# Patient Record
Sex: FEMALE | Race: OTHER | Employment: UNEMPLOYED | URBAN - METROPOLITAN AREA
[De-identification: names, ages, dates, MRNs, and addresses within clinical notes are randomized per-mention and may not be internally consistent; named-entity substitution may affect disease eponyms.]

---

## 2020-08-20 ENCOUNTER — TELEPHONE (OUTPATIENT)
Dept: FAMILY MEDICINE CLINIC | Age: 40
End: 2020-08-20

## 2020-08-20 NOTE — TELEPHONE ENCOUNTER
----- Message from Markel Nageotte sent at 8/18/2020  4:15 PM EDT -----  Regarding: Dr. Ysabel Hanna  Appointment not available    Caller's first and last name and relationship to patient (if not the patient):      Best contact number: [phone]      Preferred date and time:First  available       Scheduled appointment date and time: N/A       Reason for appointment: Pt is in need of a , she is currently pregnant and she has no insurance attempting to schedule a NP appointment       Details to clarify the request:      [place]

## 2020-08-21 NOTE — TELEPHONE ENCOUNTER
Scheduled with patient through [place]. Dr. Tati Tamez   Received: 2 days ago   Message Contents   Adrianna Benavides [facility] Front Office                 Reason: The patient would like to schedule an in-person visit in regard to a new patient appointment with Dr. Hever Cross.      Phone: [phone]

## 2020-08-22 ENCOUNTER — TELEPHONE (OUTPATIENT)
Dept: FAMILY MEDICINE CLINIC | Age: 40
End: 2020-08-22

## 2020-08-22 NOTE — TELEPHONE ENCOUNTER
----- Message from Rosie Talley sent at 2020 10:57 AM EDT -----  Regarding: Dr. Liss Thorne telephone  Appointment not available    Patient's first and last name: Jeanette Chong  : 1980    Caller's first and last name and relationship to patient (if not the patient): pt  Best contact number: [phone]  Preferred date and time: Any day Evening  Scheduled appointment date and time: n/a  Reason for appointment: New Patient  Details to clarify the request: Pt would like to schedule a appointment as soon as possible for prenatal care. Pt would like a call back to schedule in office. Pt would like to establish care with PCP. Pt need .

## 2020-08-25 ENCOUNTER — VIRTUAL VISIT (OUTPATIENT)
Dept: FAMILY MEDICINE CLINIC | Age: 40
End: 2020-08-25

## 2020-08-25 DIAGNOSIS — N92.6 MISSED MENSES: ICD-10-CM

## 2020-08-25 DIAGNOSIS — Z3A.19 19 WEEKS GESTATION OF PREGNANCY: Primary | ICD-10-CM

## 2020-08-25 DIAGNOSIS — O09.529 ANTEPARTUM MULTIGRAVIDA OF ADVANCED MATERNAL AGE: ICD-10-CM

## 2020-08-25 DIAGNOSIS — Z98.891 HISTORY OF CESAREAN DELIVERY: ICD-10-CM

## 2020-08-25 PROCEDURE — 99443 PR PHYS/QHP TELEPHONE EVALUATION 21-30 MIN: CPT | Performed by: STUDENT IN AN ORGANIZED HEALTH CARE EDUCATION/TRAINING PROGRAM

## 2020-08-25 NOTE — PROGRESS NOTES
[address] Medicine Residency Attending Addendum:  Dr. Lisset Mayers MD,  the patient and I were not physically present during this encounter. The resident and I are concurrently monitoring the patient care through appropriate telecommunication technology. I discussed the findings, assessment and plan with the resident and agree with the resident's findings and plan as documented in the resident's note. Lorie oGlden MD      Will get in person initial OB as soon as possible. Will need dating US, MFM consult and initial labs. Will also need to start on aspirin for AMA.

## 2020-08-25 NOTE — PROGRESS NOTES
Marzena Smith  47 y.o. female  1980  [address] [facility]  [id number]    [phone] (home)      [address]:    Telephone Encounter  Cam Wallace MD       Encounter Date: 2020 at 4:07 PM    Consent: Marzena Smith, who was seen by synchronous (real-time) audio only technology, and/or her healthcare decision maker, is aware that this patient-initiated, Telehealth encounter on 2020 is a billable service, with coverage as determined by her insurance carrier. She is aware that she may receive a bill and has provided verbal consent to proceed: Yes. Chief Complaint   Patient presents with    Missed Menses       History of Present Illness   Marzena Smith is a 47 y.o. female was evaluated by telephone. I communicated with the patient and/or health care decision maker about missed menses. This is not a planned pregnancy, is happy about pregnancy. Does not have a relationship with FOB. She is at 19w2d gestation by LMP (LMP 20). Has regular periods    History of GDM or DM? no  History of GHTN or HTN? no  History of pre-eclampsia? no  Taking prenatal vitamins? no  History of Sexual trauma? ?? No    OB History    Para Term  AB Living   3 3 3 0 0 2   SAB TAB Ectopic Molar Multiple Live Births             2      # Outcome Date GA Lbr Elijah/2nd Weight Sex Delivery Anes PTL Lv   3 Term 10/28/04 40w0d  7 lb (3.175 kg) F CS-Unspec  N MILAGROS      Complications: Previous  section   2 Term 01 40w0d   F CS-Unspec  Y MILAGROS      Complications:  Other (comment), Transverse or oblique fetal presentation   1 Term                  Patient Screening for COVID-19:     1) Patient denies complaints of shortness of breath or difficulty breathing, sore throat,  chills, fatigue, muscle aches, loss of taste or smell, or GI symptoms(nausea, vomiting or diarrhea): No    2) Patient denies complaints of cough or fever over 100F: No    3) Patient denies leaving the country in the past 14 days or any other recent travel: No    4) Patient denies being exposed to anyone with COVID-19 and has not been around any one that has been sick with COVID-19 like symptoms as listed above: No     5) Patient informed to wear mask when arriving for appointment: No        Review of Systems   Review of Systems   Constitutional: Negative for chills and fever. HENT: Negative for congestion and sore throat. Eyes: Negative for blurred vision and double vision. Respiratory: Negative for cough, hemoptysis, sputum production, shortness of breath and wheezing. Cardiovascular: Negative for chest pain, palpitations and leg swelling. Gastrointestinal: Negative for abdominal pain, blood in stool, constipation, diarrhea, heartburn, melena, nausea and vomiting. Genitourinary: Negative for dysuria and urgency. Musculoskeletal: Negative for back pain and joint pain. Skin: Negative for rash. Neurological: Negative for dizziness, focal weakness and headaches. Psychiatric/Behavioral: Negative for suicidal ideas. Vitals/Objective:   General: Patient speaking in complete sentences without effort. Normal speech and cooperative. Due to this being a Virtual Check-in/Telephone evaluation, many elements of the physical examination are unable to be assessed. Assessment and Plan:   Time-based coding, delete if not needed: I spent at least 25 minutes with this established patient, and >50% of the time was spent counseling and/or coordinating care regarding missed menses  Total Time: minutes: 30  This is a 47 y.o  at 19w2d by LMP. Preganancy is complicated by history of previous c-sections x2, AMA, and late to care. 1. SIUP at 19w2d by LMP  - Patient was advised to start taking prenatal vitamins  - prenatal vit-iron fumarate-fa 27 mg iron- 0.8 mg tab tablet; Take 1 Tab by mouth daily. Dispense: 120 Tab;  Refill: 3  - Will schedule initial ob appointment    2. History of previous C-sections x2.   - Patient will need repeat             We discussed the expected course, resolution and complications of the diagnosis(es) in detail. Medication risks, benefits, costs, interactions, and alternatives were discussed as indicated. I advised her to contact the office if her condition worsens, changes or fails to improve as anticipated. She expressed understanding with the diagnosis(es) and plan. Patient understands that this encounter was a temporary measure, and the importance of further follow up and examination was emphasized. Patient verbalized understanding. Patient informed to follow up: Within the next two weeks. I affirm this is a Patient Initiated Episode with an Established Patient who has not had a related appointment within my department in the past 7 days or scheduled within the next 24 hours. Note: not billable if this call serves to triage the patient into an appointment for the relevant concern      Electronically Signed: Romeo Elliott MD  Providers location when delivering service: home    CPT:  92346 (5-10 minutes)  92372 (11-20 minutes)  21  (21-30 minutes)    Medicare:   - Virtual Check-in      ICD-10-CM ICD-9-CM    1. 19 weeks gestation of pregnancy  Z3A.19 V22.2 prenatal vit-iron fumarate-fa 27 mg iron- 0.8 mg tab tablet   2. Missed menses  N92.6 626.4    3. Antepartum multigravida of advanced maternal age  O12.46 65.56    3. History of  delivery  I31.008 V45.89        Pursuant to the emergency declaration under the [facility] waiver authority and the [employer] Act, this Virtual  Visit was conducted, with patient's consent, to reduce the patient's risk of exposure to COVID-19 and provide continuity of care for an established patient.       History   Patients past medical, surgical and family histories were personally reviewed and updated. No past medical history on file. Past Surgical History:   Procedure Laterality Date    HX  SECTION       No family history on file.   Social History     Socioeconomic History    Marital status: Not on file     Spouse name: Not on file    Number of children: Not on file    Years of education: Not on file    Highest education level: Not on file   Occupational History    Not on file   Social Needs    Financial resource strain: Not on file    Food insecurity     Worry: Not on file     Inability: Not on file    Transportation needs     Medical: Not on file     Non-medical: Not on file   Tobacco Use    Smoking status: Never Smoker   Substance and Sexual Activity    Alcohol use: Not Currently    Drug use: Never    Sexual activity: Not Currently     Partners: Male   Lifestyle    Physical activity     Days per week: Not on file     Minutes per session: Not on file    Stress: Not on file   Relationships    Social connections     Talks on phone: Not on file     Gets together: Not on file     Attends Mosque service: Not on file     Active member of club or organization: Not on file     Attends meetings of clubs or organizations: Not on file     Relationship status: Not on file    Intimate partner violence     Fear of current or ex partner: Not on file     Emotionally abused: Not on file     Physically abused: Not on file     Forced sexual activity: Not on file   Other Topics Concern    Not on file   Social History Narrative    Not on file            Current Medications/Allergies   Medications and Allergies reviewed:      Not on File

## 2020-08-25 NOTE — Clinical Note
Douglas Monday!!! This patient is late to care and is already 19weeks. I was hoping we could get her in ASAP for initial ob.      Thank you

## 2020-08-27 ENCOUNTER — TELEPHONE (OUTPATIENT)
Dept: FAMILY MEDICINE CLINIC | Age: 40
End: 2020-08-27

## 2020-09-04 ENCOUNTER — HOSPITAL ENCOUNTER (OUTPATIENT)
Dept: LAB | Age: 40
Discharge: HOME | End: 2020-09-04
Payer: SUBSIDIZED

## 2020-09-04 ENCOUNTER — INITIAL PRENATAL (OUTPATIENT)
Dept: FAMILY MEDICINE CLINIC | Age: 40
End: 2020-09-04
Payer: SUBSIDIZED

## 2020-09-04 VITALS
OXYGEN SATURATION: 97 % | WEIGHT: 146 LBS | RESPIRATION RATE: 14 BRPM | BODY MASS INDEX: 32.84 KG/M2 | HEIGHT: 56 IN | DIASTOLIC BLOOD PRESSURE: 57 MMHG | HEART RATE: 90 BPM | TEMPERATURE: 98.9 F | SYSTOLIC BLOOD PRESSURE: 95 MMHG

## 2020-09-04 DIAGNOSIS — O99.210 OBESITY AFFECTING PREGNANCY, ANTEPARTUM: ICD-10-CM

## 2020-09-04 DIAGNOSIS — O09.529 ANTEPARTUM MULTIGRAVIDA OF ADVANCED MATERNAL AGE: ICD-10-CM

## 2020-09-04 DIAGNOSIS — Z98.891 HISTORY OF 2 CESAREAN SECTIONS: ICD-10-CM

## 2020-09-04 DIAGNOSIS — O09.30 LATE PRENATAL CARE: ICD-10-CM

## 2020-09-04 DIAGNOSIS — O09.92 SUPERVISION OF HIGH RISK PREGNANCY IN SECOND TRIMESTER: ICD-10-CM

## 2020-09-04 DIAGNOSIS — O09.92 SUPERVISION OF HIGH RISK PREGNANCY IN SECOND TRIMESTER: Primary | ICD-10-CM

## 2020-09-04 LAB
ANTIBODY SCREEN, EXTERNAL: NEGATIVE
BILIRUB UR QL STRIP: NEGATIVE
CHLAMYDIA, EXTERNAL: NEGATIVE
ERYTHROCYTE [DISTWIDTH] IN BLOOD BY AUTOMATED COUNT: 13.3 % (ref 11.5–14.5)
EST. AVERAGE GLUCOSE BLD GHB EST-MCNC: 103 MG/DL
GLUCOSE UR-MCNC: NEGATIVE MG/DL
HBA1C MFR BLD: 5.2 % (ref 4–5.6)
HBSAG, EXTERNAL: NEGATIVE
HCT VFR BLD AUTO: 36.3 % (ref 35–47)
HGB BLD-MCNC: 11.8 G/DL (ref 11.5–16)
HIV, EXTERNAL: NON REACTIVE
KETONES P FAST UR STRIP-MCNC: NORMAL MG/DL
MCH RBC QN AUTO: 31 PG (ref 26–34)
MCHC RBC AUTO-ENTMCNC: 32.5 G/DL (ref 30–36.5)
MCV RBC AUTO: 95.3 FL (ref 80–99)
N. GONORRHEA, EXTERNAL: NEGATIVE
NRBC # BLD: 0 K/UL (ref 0–0.01)
NRBC BLD-RTO: 0 PER 100 WBC
PH UR STRIP: 6 [PH] (ref 4.6–8)
PLATELET # BLD AUTO: 267 K/UL (ref 150–400)
PMV BLD AUTO: 9.4 FL (ref 8.9–12.9)
PROT UR QL STRIP: NEGATIVE
RBC # BLD AUTO: 3.81 M/UL (ref 3.8–5.2)
RPR, EXTERNAL: NON REACTIVE
RUBELLA, EXTERNAL: NORMAL
SP GR UR STRIP: 1.03 (ref 1–1.03)
TYPE, ABO & RH, EXTERNAL: NORMAL
UA UROBILINOGEN AMB POC: NORMAL (ref 0.2–1)
URINALYSIS CLARITY POC: NORMAL
URINALYSIS COLOR POC: YELLOW
URINE BLOOD POC: NEGATIVE
URINE LEUKOCYTES POC: NEGATIVE
URINE NITRITES POC: NEGATIVE
WBC # BLD AUTO: 9.3 K/UL (ref 3.6–11)

## 2020-09-04 PROCEDURE — 90686 IIV4 VACC NO PRSV 0.5 ML IM: CPT | Performed by: FAMILY MEDICINE

## 2020-09-04 PROCEDURE — 87491 CHLMYD TRACH DNA AMP PROBE: CPT

## 2020-09-04 PROCEDURE — 88175 CYTOPATH C/V AUTO FLUID REDO: CPT

## 2020-09-04 PROCEDURE — 81003 URINALYSIS AUTO W/O SCOPE: CPT | Performed by: FAMILY MEDICINE

## 2020-09-04 PROCEDURE — 90471 IMMUNIZATION ADMIN: CPT | Performed by: FAMILY MEDICINE

## 2020-09-04 PROCEDURE — 87624 HPV HI-RISK TYP POOLED RSLT: CPT

## 2020-09-04 PROCEDURE — 0500F INITIAL PRENATAL CARE VISIT: CPT | Performed by: FAMILY MEDICINE

## 2020-09-04 RX ORDER — TRIAMCINOLONE ACETONIDE 1 MG/G
CREAM TOPICAL 2 TIMES DAILY
Qty: 15 G | Refills: 0 | Status: SHIPPED | OUTPATIENT
Start: 2020-09-04

## 2020-09-04 NOTE — PROGRESS NOTES
History and Physical    Patient: Yoshi Franklin MRN: [id number]  SSN: [id number]    YOB: 1980  Age: 47 y.o. Sex: female      Subjective:      Yoshi Franklin is a 47 y.o. female  at 20w5d who presents for IOB visit. Surprise pregnancy, is happy about it  FOB not involved, she does know who it is, they do not talk and he will not be involved   Pt babysits for work  She lives with her 2 daughters and a friend   Has lived in this country for 15 years   Cannot read    Rash on L arm   Present for 1 month  Itches  No one around her with the same rash     No past medical history on file. Past Surgical History:   Procedure Laterality Date    HX  SECTION        History reviewed. No pertinent family history. Social History     Tobacco Use    Smoking status: Never Smoker    Smokeless tobacco: Never Used   Substance Use Topics    Alcohol use: Not Currently      Prior to Admission medications    Medication Sig Start Date End Date Taking? Authorizing Provider   triamcinolone acetonide (KENALOG) 0.1 % topical cream Apply  to affected area two (2) times a day. use thin layer 20  Yes Harmeet Arceo, DO        No Known Allergies    Review of Systems:  ROS negative except as noted in HPI. Objective:     Vitals:    20 1402   BP: 95/57   Pulse: 90   Resp: 14   Temp: 98.9 °F (37.2 °C)   TempSrc: Oral   SpO2: 97%   Weight: 146 lb (66.2 kg)   Height: 4' 7.91\" (1.42 m)        Physical Exam:  See prenatal physical exam.    Assessment/Plan:   41yo  at 20w5d by LMP   1. IUP: IOB labs with pap today, anatomy scheduled  2. AMA: ASA, will see genetic counselor   3. Obesity: check A1C, will get GTT next visit   4.   Late to care: 20 wk     Signed By: Saba Gonzalez DO     2020

## 2020-09-04 NOTE — PROGRESS NOTES
History and Physical    Patient: Ashlee Pena MRN: [id number]  SSN: [id number]    YOB: 1980  Age: 47 y.o. Sex: female      Subjective:      Ashlee Pena is a 47 y.o. female  at 20w5d who presents for IOB visit. Surprise pregnancy, is happy about it  FOB not involved, she does know who it is, they do not talk and he will not be involved   Pt babysits for work  She lives with her 2 daughters and a friend   Has lived in this country for 15 years   Cannot read    Rash on L arm   Present for 1 month  Itches  No one around her with the same rash     No past medical history on file. Past Surgical History:   Procedure Laterality Date    HX  SECTION        History reviewed. No pertinent family history. Social History     Tobacco Use    Smoking status: Never Smoker    Smokeless tobacco: Never Used   Substance Use Topics    Alcohol use: Not Currently      Prior to Admission medications    Medication Sig Start Date End Date Taking? Authorizing Provider   triamcinolone acetonide (KENALOG) 0.1 % topical cream Apply  to affected area two (2) times a day. use thin layer 20  Yes Harmeet Arceo, DO        No Known Allergies    Review of Systems:  ROS negative except as noted in HPI. Objective:     Vitals:    20 1402   BP: 95/57   Pulse: 90   Resp: 14   Temp: 98.9 °F (37.2 °C)   TempSrc: Oral   SpO2: 97%   Weight: 146 lb (66.2 kg)   Height: 4' 7.91\" (1.42 m)        Physical Exam:  See prenatal physical exam.    Assessment/Plan:   41yo  at 20w5d by LMP   1. IUP: IOB labs with pap today, anatomy scheduled  2. AMA: ASA, declines genetic testing, will see genetic counselor   3. Obesity: check A1C, will get GTT next visit   4. Late to care: 20 wk   5.   Hx CS: x2, will plan for repeat at 39wk     Signed By: Jarrett Sabillon DO     2020

## 2020-09-04 NOTE — PROGRESS NOTES
Chief Complaint   Patient presents with    Initial Prenatal Visit       Patient identified with 2 patient identifiers (name and D. O. B)    Patient is a  at [employer]    Leakage of Fluid: NO  Vaginal Bleeding: NO  Fetal Movement: YES  Prenatal vitamins: YES  Having Contractions: NO  Pain: NO  Patient has rash on left arm x 1 month. Itches.     Visit Vitals  BP 95/57 (BP 1 Location: Left arm, BP Patient Position: Sitting)   Pulse 90   Temp 98.9 °F (37.2 °C) (Oral)   Resp 14   Ht 4' 7.91\" (1.42 m)   Wt 146 lb (66.2 kg)   LMP 2020   SpO2 97%   BMI 32.84 kg/m²       Immunization History   Administered Date(s) Administered    Influenza Vaccine (Quad) PF 2020

## 2020-09-05 LAB
ABO + RH BLD: NORMAL
BLOOD BANK CMNT PATIENT-IMP: NORMAL
BLOOD GROUP ANTIBODIES SERPL: NORMAL
HBV SURFACE AG SER QL: <0.1 INDEX
HBV SURFACE AG SER QL: NEGATIVE
HIV 1+2 AB+HIV1 P24 AG SERPL QL IA: NONREACTIVE
HIV12 RESULT COMMENT, HHIVC: NORMAL
RUBV IGG SER-IMP: REACTIVE
RUBV IGG SERPL IA-ACNC: 41.4 IU/ML
SPECIMEN EXP DATE BLD: NORMAL

## 2020-09-06 LAB
BACTERIA SPEC CULT: NORMAL
CC UR VC: NORMAL
RPR SER QL: NONREACTIVE
SERVICE CMNT-IMP: NORMAL

## 2020-09-07 LAB — VZV IGG SER IA-ACNC: 483 INDEX

## 2020-09-09 LAB
DEPRECATED HGB OTHER BLD-IMP: 0 %
HGB A MFR BLD: 97.6 % (ref 96.4–98.8)
HGB A2 MFR BLD COLUMN CHROM: 2.4 % (ref 1.8–3.2)
HGB C MFR BLD: 0 %
HGB F MFR BLD: 0 % (ref 0–2)
HGB FRACT BLD-IMP: NORMAL
HGB S BLD QL SOLY: NEGATIVE
HGB S MFR BLD: 0 %

## 2020-09-13 LAB
C TRACH RRNA SPEC QL NAA+PROBE: NEGATIVE
N GONORRHOEA RRNA SPEC QL NAA+PROBE: NEGATIVE
SPECIMEN SOURCE: NORMAL

## 2020-09-30 ENCOUNTER — HOSPITAL ENCOUNTER (OUTPATIENT)
Dept: PERINATAL CARE | Age: 40
Discharge: HOME | End: 2020-09-30
Attending: OBSTETRICS & GYNECOLOGY
Payer: SUBSIDIZED

## 2020-09-30 PROCEDURE — 99204 OFFICE O/P NEW MOD 45 MIN: CPT | Performed by: OBSTETRICS & GYNECOLOGY

## 2020-09-30 PROCEDURE — 76811 OB US DETAILED SNGL FETUS: CPT | Performed by: OBSTETRICS & GYNECOLOGY

## 2020-10-20 ENCOUNTER — TELEPHONE (OUTPATIENT)
Dept: FAMILY MEDICINE CLINIC | Age: 40
End: 2020-10-20

## 2020-10-20 NOTE — PROGRESS NOTES
Estimated Date of Delivery: 21  .   EDC by 26 week scan therefore poor dating  Normal anatomy but limited  F/u as CI

## 2020-10-20 NOTE — TELEPHONE ENCOUNTER
----- Message from Armando Chen sent at 10/20/2020  8:28 AM EDT -----  Regarding: Dr. Lucy Arceo /telephone  Patient return call    Caller's first and last name and relationship (if not the patient):   Sylvia Yap      Best contact number(s):  [phone]      Whose call is being returned:  Dr. Lucy Arceo       Details to clarify the request:   patient is returning call from message left on 10/19/2020      Cyndi Henry

## 2020-10-22 NOTE — TELEPHONE ENCOUNTER
Can you call patient and let her know she needs to come in for an appointment.  Scheduled for 10/28 at 8:45AM.

## 2020-10-23 ENCOUNTER — TELEPHONE (OUTPATIENT)
Dept: FAMILY MEDICINE CLINIC | Age: 40
End: 2020-10-23

## 2020-10-23 NOTE — TELEPHONE ENCOUNTER
Called patient to let her know her appointment is on 10/28 @ 8:45am.   LVm to patient as she did not respond

## 2020-10-28 ENCOUNTER — ROUTINE PRENATAL (OUTPATIENT)
Dept: FAMILY MEDICINE CLINIC | Age: 40
End: 2020-10-28
Payer: SUBSIDIZED

## 2020-10-28 VITALS
WEIGHT: 151 LBS | BODY MASS INDEX: 33.97 KG/M2 | SYSTOLIC BLOOD PRESSURE: 96 MMHG | TEMPERATURE: 98.2 F | OXYGEN SATURATION: 96 % | HEIGHT: 56 IN | DIASTOLIC BLOOD PRESSURE: 62 MMHG | HEART RATE: 84 BPM

## 2020-10-28 DIAGNOSIS — Z98.891 HISTORY OF C-SECTION: ICD-10-CM

## 2020-10-28 DIAGNOSIS — Z3A.30 30 WEEKS GESTATION OF PREGNANCY: Primary | ICD-10-CM

## 2020-10-28 DIAGNOSIS — O99.210 OBESITY IN PREGNANCY: ICD-10-CM

## 2020-10-28 DIAGNOSIS — O09.529 HIGH-RISK PREGNANCY, MULTIGRAVIDA OF ADVANCED MATERNAL AGE, ANTEPARTUM: ICD-10-CM

## 2020-10-28 LAB
BILIRUB UR QL STRIP: NEGATIVE
ERYTHROCYTE [DISTWIDTH] IN BLOOD BY AUTOMATED COUNT: 13.1 % (ref 11.5–14.5)
GLUCOSE 1H P 100 G GLC PO SERPL-MCNC: 155 MG/DL (ref 65–140)
GLUCOSE UR-MCNC: NEGATIVE MG/DL
HCT VFR BLD AUTO: 38.3 % (ref 35–47)
HGB BLD-MCNC: 12.6 G/DL (ref 11.5–16)
KETONES P FAST UR STRIP-MCNC: NEGATIVE MG/DL
MCH RBC QN AUTO: 31.1 PG (ref 26–34)
MCHC RBC AUTO-ENTMCNC: 32.9 G/DL (ref 30–36.5)
MCV RBC AUTO: 94.6 FL (ref 80–99)
NRBC # BLD: 0 K/UL (ref 0–0.01)
NRBC BLD-RTO: 0 PER 100 WBC
PH UR STRIP: 6.5 [PH] (ref 4.6–8)
PLATELET # BLD AUTO: 260 K/UL (ref 150–400)
PMV BLD AUTO: 9.8 FL (ref 8.9–12.9)
PROT UR QL STRIP: NEGATIVE
RBC # BLD AUTO: 4.05 M/UL (ref 3.8–5.2)
SP GR UR STRIP: 1.02 (ref 1–1.03)
UA UROBILINOGEN AMB POC: NORMAL (ref 0.2–1)
URINALYSIS CLARITY POC: NORMAL
URINALYSIS COLOR POC: YELLOW
URINE BLOOD POC: NEGATIVE
URINE LEUKOCYTES POC: NEGATIVE
URINE NITRITES POC: NEGATIVE
WBC # BLD AUTO: 9.4 K/UL (ref 3.6–11)

## 2020-10-28 PROCEDURE — 90715 TDAP VACCINE 7 YRS/> IM: CPT | Performed by: STUDENT IN AN ORGANIZED HEALTH CARE EDUCATION/TRAINING PROGRAM

## 2020-10-28 PROCEDURE — 0502F SUBSEQUENT PRENATAL CARE: CPT | Performed by: STUDENT IN AN ORGANIZED HEALTH CARE EDUCATION/TRAINING PROGRAM

## 2020-10-28 PROCEDURE — 81003 URINALYSIS AUTO W/O SCOPE: CPT | Performed by: STUDENT IN AN ORGANIZED HEALTH CARE EDUCATION/TRAINING PROGRAM

## 2020-10-28 PROCEDURE — 90471 IMMUNIZATION ADMIN: CPT | Performed by: STUDENT IN AN ORGANIZED HEALTH CARE EDUCATION/TRAINING PROGRAM

## 2020-10-28 NOTE — PATIENT INSTRUCTIONS
Semanas 26 a 30 de boyd embarazo: Instrucciones de cuidado  Weeks 26 to 30 of Your Pregnancy: Care Instructions  Instrucciones de cuidado    Ahora usted se encuentra en el último trimestre del [employer]. Boyd bebé está creciendo rápidamente. Y es probable que sienta que boyd bebé se mueve con más frecuencia. Es posible que boyd médico le diga que cuente la cantidad de patadas que da boyd bebé. La espalda puede dolerle mientras boyd cuerpo se acostumbra al tamaño y a la longitud de boyd bebé. Si todavía no le corona aplicado la vacuna Tdap (tétanos, difteria y tos [place]) kuldeep pablo [employer]ru[employer], hable con boyd médico acerca de aplicársela. Rosetta Arjun a proteger a boyd recién nacido contra la infección por tos ferina. Kuldeep pablo tiempo, es importante que se cuide y preste atención a lo que boyd cuerpo necesita. Si tiene [employer], busque formas de estar con boyd luis que satisfagan boyd nivel de comodidad y deseo. Utilice las recomendaciones proporcionadas en esta hoja de cuidados para encontrar boyd propia manera de vivir la sexualidad. La atención de seguimiento es iman parte clave de boyd tratamiento y seguridad. Asegúrese de hacer y acudir a todas las citas, y llame a boyd médico si está teniendo problemas. También es iman buena idea saber los resultados de annamarie exámenes y mantener iman lista de los medicamentos que candido. ¿Cómo puede cuidarse en el hogar? [place] boyd trabajo con calma  · Tómese descansos frecuentes. Si es posible, deje de trabajar cuando esté cansada y descanse kuldeep la hora del almuerzo. · Vaya al baño cada 2 horas. · Cambie de posición con frecuencia. Si está sentada kuldeep mucho tiempo, póngase de pie y camine. · Si está lemons kuldeep mucho tiempo, apoye un pie sobre un banco bajo, flexionando la rodilla. Después de estar lemons kuldeep mucho tiempo, siéntese con los pies elevados.   · Evite las [place], las sustancias químicas y el humo del tabaco.  Viva boyd sexualidad a boyd manera  · Apteegi 1 kuldeep el [employer], a menos que boyd médico le diga que no. · Podría tener un gran deseo sexual o estar completamente desinteresada. · El abdomen cada vez más radha podría hacer difícil encontrar iman buena posición kuldeep el coito. Experimente y explore. · Cuando boyd luis le toque los senos, podría tener contracciones en [place]. · Un masaje en la espalda podría aliviar el dolor de espalda o los cólicos que a veces se sienten después del [place]. Aprenda sobre el trabajo de parto prematuro  · Esté alerta a las señales del trabajo de parto prematuro. Es posible que esté comenzando el [facility] [place] de parto si:  ? Tiene cólicos similares a los del período menstrual, con o sin náuseas. ? Tiene aproximadamente 4 contracciones o más en 20 minutos, o alrededor de 8 o más en 1 hora, incluso después de driss tomado un vaso de agua y estar en reposo. ? Tiene un dolor sordo (leve amber continuo) en la endy baja de la espalda que no desaparece cuando cambia de posición. ? Siente dolor o presión en la pelvis que aparece y desaparece con determinada regularidad. ? Tiene espasmos intestinales o síntomas similares a los de la gripe, con o sin diarrea. ? Nota un aumento o un cambio en el flujo vaginal. El flujo podría ser Berdine Her, tener consistencia mucosa, ser [place]ocker rastros de [place]. ? Se le rompe la traci. · Si augie que ha comenzado un trabajo de parto prematuro:  ? Helen 2 o 3 vasos de agua o jugo. No beber suficientes líquidos puede provocar contracciones. ? Detenga lo que esté haciendo, y vacíe la vejiga. Luego, acuéstese sobre el lado alexander kuldeep al menos 1 hora. ? Mientras descansa de corine, ubique boyd esternón. Coloque los dedos en el punto blando bravo debajo de él. Mueva los dedos hacia abajo en dirección al ombligo para encontrar la parte superior del [place]. Compruebe si está tenso. ? Las contracciones pueden ser débiles o intensas. Registre annamarie contracciones kuldeep iman hora.  Cuente iman contracción desde el comienzo de iman hasta el comienzo de [place]. ? Earla Jenniferkun contracciones lisette que no ocurren con la regularidad de un patrón reciben el nombre de contracciones de [place]. Estas son \"contracciones de práctica\", amber no indican el inicio del [place] de [place]. Por lo general, se detienen si cambia de [place]. ? Si tiene contracciones regulares, llame a boyd médico.  ¿Dónde puede encontrar más información en inglés? Thais denny http://www.[employer].com/  Zora Mcwilliams A009 en la búsqueda para aprender más acerca de \"Semanas 26 a 30 de boyd embarazo: Instrucciones de cuidado. \"  Revisado: 11 febrero, 2020               Versión del contenido: 12.6  © 1197-1175 [employer]. Las instrucciones de cuidado fueron adaptadas bajo licencia por [employer] (which disclaims liability or warranty for this information). Si usted tiene [place] afección médica o sobre estas instrucciones, siempre pregunte a boyd profesional de naveen. [employer] niega toda garantía o responsabilidad por boyd uso de esta información.

## 2020-10-28 NOTE — PROGRESS NOTES
Return OB Visit       Subjective:   Rodney Apgar 47 y.o.  at [facility] LING MARCUS:  30w1d, DARIO: Estimated Date of Delivery: 21 by [address] at [address] on . Patient reports feeling well. No new concerns at this time. Patient denies headache, visual disturbances, CP, SOB, RUQ pain, dysuria, and calf tenderness. Pregnancy complicated by AMA, Obesity, h/o CSection x 2, and Late to Care (20w). OB History:  See Chart    LOF: No  Vaginal bleeding: No  Fetal movement (after 20 weeks): Yes  Contractions: No  Taking prenatal vitamins: Yes  Taking ASA: Yes    Hx of GDM or DM: No  Hx of GHTN or HTN: No  Hx of STI's: No  Hx of Depression: No      Allergies- reviewed:   No Known Allergies  Medications- reviewed:   Current Outpatient Medications   Medication Sig    prenatal vit-calcium-iron-fa (PRENATAL PLUS with CALCIUM) 27 mg iron- 1 mg tab Take 1 Tab by mouth daily.  prenatal vit-iron fumarate-fa 27 mg iron- 0.8 mg tab tablet TAKE 1 TABLET BY MOUTH EVERY DAY    triamcinolone acetonide (KENALOG) 0.1 % topical cream Apply  to affected area two (2) times a day. use thin layer     No current facility-administered medications for this visit. Past Medical History- reviewed:  No past medical history on file.   Past Surgical History- reviewed:   Past Surgical History:   Procedure Laterality Date    HX  SECTION       Social History- reviewed:  Social History     Socioeconomic History    Marital status: SINGLE     Spouse name: Not on file    Number of children: Not on file    Years of education: Not on file    Highest education level: Not on file   Occupational History    Not on file   Social Needs    Financial resource strain: Not on file    Food insecurity     Worry: Not on file     Inability: Not on file    Transportation needs     Medical: Not on file     Non-medical: Not on file   Tobacco Use    Smoking status: Never Smoker    Smokeless tobacco: Never Used   Substance and Sexual Activity    Alcohol use: Not Currently    Drug use: Not on file    Sexual activity: Not on file   Lifestyle    Physical activity     Days per week: Not on file     Minutes per session: Not on file    Stress: Not on file   Relationships    Social connections     Talks on phone: Not on file     Gets together: Not on file     Attends Scientologist service: Not on file     Active member of club or organization: Not on file     Attends meetings of clubs or organizations: Not on file     Relationship status: Not on file    Intimate partner violence     Fear of current or ex partner: Not on file     Emotionally abused: Not on file     Physically abused: Not on file     Forced sexual activity: Not on file   Other Topics Concern    Not on file   Social History Narrative    Not on file     Immunizations- reviewed:   Immunization History   Administered Date(s) Administered    Influenza Vaccine True Office) PF (>6 Mo Flulaval, Fluarix, and >3 Yrs Nhung James [id number]) 2020    Tdap 10/28/2020       OB History- reviewed:  OB History    Para Term  AB Living   3 2 2     2   SAB TAB Ectopic Molar Multiple Live Births             2      # Outcome Date GA Lbr Elijah/2nd Weight Sex Delivery Anes PTL Lv   3 Current            2 Term 10/28/04 41w0d  8 lb (3.629 kg) F CS-Unspec  N MILAGROS   1 Term 01 41w0d  10 lb (4.536 kg) F CS-Unspec  N MILAGROS      Birth Comments: CS done for transverse presentation         Objective:     Visit Vitals  BP 96/62 (BP 1 Location: Left arm, BP Patient Position: Sitting)   Pulse 84   Temp 98.2 °F (36.8 °C) (Temporal)   Ht 4' 7.91\" (1.42 m)   Wt 151 lb (68.5 kg)   LMP 2020 (Exact Date)   SpO2 96%   BMI 33.96 kg/m²       Physical Exam:  GENERAL APPEARANCE: alert, well appearing, in no apparent distress  ABDOMEN: gravid, fundal height 29 cm, FHT present at 135 average bpm  PSYCH: normal mood and affect    Labs  Recent Results (from the past 12 hour(s))   AMB POC URINALYSIS DIP STICK AUTO W/O MICRO Collection Time: 10/28/20  8:44 AM   Result Value Ref Range    Color (UA POC) Yellow     Clarity (UA POC) Slightly Cloudy     Glucose (UA POC) Negative Negative    Bilirubin (UA POC) Negative Negative    Ketones (UA POC) Negative Negative    Specific gravity (UA POC) 1.020 1.001 - 1.035    Blood (UA POC) Negative Negative    pH (UA POC) 6.5 4.6 - 8.0    Protein (UA POC) Negative Negative    Urobilinogen (UA POC) 0.2 mg/dL 0.2 - 1    Nitrites (UA POC) Negative Negative    Leukocyte esterase (UA POC) Negative Negative       Assessment         ICD-10-CM ICD-9-CM    1. 30 weeks gestation of pregnancy  Z3A.30 V22.2 AMB POC URINALYSIS DIP STICK AUTO W/O MICRO      GLUCOSE, GESTATIONAL 1 HR TOLERANCE      TETANUS, DIPHTHERIA TOXOIDS AND ACELLULAR PERTUSSIS VACCINE (TDAP), IN INDIVIDS. >=7, IM      CBC W/O DIFF      CBC W/O DIFF      GLUCOSE, GESTATIONAL 1 HR TOLERANCE   2. Obesity in pregnancy  O99.210 649.10    3. High-risk pregnancy, multigravida of advanced maternal age, antepartum  O12.46 V23.82    4. History of   Z98.891 V45.89          Plan   47 y.o.  at 30w1d, DARIO Estimated Date of Delivery: 21 here for return OB visit. SIUP (30w1d)   IOB labs:  O +, Ab screen Negative, HIV/RPR/C/GC/Hep B Negative, VZV/Rubella Immune, initial Hgb 11.8, Hbg fract wnl, Ucx unremarkable, pap NILM   Influenza: Yes 2020   Genetic Screening: Declined   [facility] Anatomy Scan: Anatomy wnl, Breech presentation, Recommended changing DARIO based on US   1hr GTT today   Tdap today   Repeat CBC today   Scheduled  for 40 weeks   o To prevent premature delivery given dating by [address] was at 26w (can be about 3 weeks off)   Encouraged to keep home BP log for telemed visit    AMA: ASA 81 mg    Obesity: A1c 5.2.  1h r GTT today      Orders Placed This Encounter    TETANUS, DIPHTHERIA TOXOIDS AND ACELLULAR PERTUSSIS VACCINE (TDAP), IN INDIVIDS. >=7, IM    GLUCOSE, GESTATIONAL 1 HR TOLERANCE     Standing Status: Future     Number of Occurrences:   1     Standing Expiration Date:   10/28/2021    CBC W/O DIFF     Standing Status:   Future     Number of Occurrences:   1     Standing Expiration Date:   2/28/2021    AMB POC URINALYSIS DIP STICK AUTO W/O MICRO    prenatal vit-calcium-iron-fa (PRENATAL PLUS with CALCIUM) 27 mg iron- 1 mg tab     Sig: Take 1 Tab by mouth daily.  prenatal vit-iron fumarate-fa 27 mg iron- 0.8 mg tab tablet     Sig: TAKE 1 TABLET BY MOUTH EVERY DAY     Labor precautions discussed, including: Regular painful contractions, lasting for greater than one hour, taking your breath away; any vaginal bleeding; any leakage of fluid; or absent or decreased fetal movement. Call M.D. on call if any of these symptoms or signs occur. I have discussed the diagnosis with the patient and the intended plan as seen in the above orders. The patient has received an after-visit summary and questions were answered concerning future plans. I have discussed medication side effects and warnings with the patient as well. Informed pt to return to the office or go to the ER if she experiences vaginal bleeding, vaginal discharge, leaking of fluid, pelvic cramping.     Pt seen and discussed with Dr. Kacy Rawls (attending physician)    Gabriel Abdi MD  Family Medicine Resident

## 2020-10-28 NOTE — PROGRESS NOTES
Chief Complaint   Patient presents with    Routine Prenatal Visit     . 30w1d. No LOF, no bleeding. +fetal movement. NO gatito villanueva. Visit Vitals  BP 96/62 (BP 1 Location: Left arm, BP Patient Position: Sitting)   Pulse 84   Temp 98.2 °F (36.8 °C) (Temporal)   Ht 4' 7.91\" (1.42 m)   Wt 151 lb (68.5 kg)   SpO2 96%   BMI 33.96 kg/m²     1. Have you been to the ER, urgent care clinic since your last visit? Hospitalized since your last visit? No    2. Have you seen or consulted any other health care providers outside of the [address] [place] [address] since your last visit? Include any pap smears or colon screening.  No

## 2020-10-28 NOTE — PROGRESS NOTES
I reviewed with the resident the medical history and the resident's findings on the physical examination. I discussed with the resident the patient's diagnosis and concur with the plan. Estimated Date of Delivery: 21    39yo  at 30w1d by 26wk scan   1. IUP: RH pos, anatomy normal, s/p flu and tdap, CBC+GTT today   2. AMA: ASA, declines genetic testing, will see genetic counselor   3. Obesity: A1C 5.2   4. Late to care: 20 wk   5. Hx CS: x2, will plan for repeat at [address] [facility] given #6, scheduled  at 9:30, pt to arrive 7:30 fasting, chart sent to Pat Becker, inform patient at next visit     6.   Poor dating criteria: dating by 26wk scan

## 2020-10-29 DIAGNOSIS — R89.9 ABNORMAL LABORATORY TEST RESULT: Primary | ICD-10-CM

## 2020-10-29 LAB — GTT, 1 HR, GLUCOLA, EXTERNAL: 155

## 2020-10-29 NOTE — PROGRESS NOTES
1hr GTT elevated, ordered 3hr,  aware and will schedule lab appt. Called patient and she is aware as well.

## 2020-10-30 ENCOUNTER — TELEPHONE (OUTPATIENT)
Dept: FAMILY MEDICINE CLINIC | Age: 40
End: 2020-10-30

## 2020-10-30 NOTE — TELEPHONE ENCOUNTER
----- Message from Nithin Workman MD sent at 10/29/2020  7:24 PM EDT -----  Regarding: Lab appt  Please schedule 3 hr GTT, her 1 hr GTT was abnormal.    Thank you,  Caleb Sheppard

## 2020-11-03 ENCOUNTER — LAB ONLY (OUTPATIENT)
Dept: FAMILY MEDICINE CLINIC | Age: 40
End: 2020-11-03

## 2020-11-03 DIAGNOSIS — R89.9 ABNORMAL LABORATORY TEST RESULT: ICD-10-CM

## 2020-11-03 LAB
GESTATIONAL 3HR GTT,GESTA: ABNORMAL
GLUCOSE 1H P 100 G GLC PO SERPL-MCNC: 159 MG/DL (ref 65–180)
GLUCOSE P FAST SERPL-MCNC: 87 MG/DL (ref 65–95)
GLUCOSE, 2 HR,GSTT2: 179 MG/DL (ref 65–155)
GLUCOSE, 3 HR,GSTT3: 112 MG/DL (ref 65–140)
GTT 120 MIN, EXTERNAL: 176
GTT 180 MIN, EXTERNAL: 112
GTT 60 MIN, EXTERNAL: 159
GTT, FASTING, EXTERNAL: 87

## 2020-11-10 NOTE — PROGRESS NOTES
Eber Peng  47 y.o. female  1980  [address]  [id number]    [phone] (home)      [address]:    [facility] Telephone Encounter  Colletta Mosher, DO       Encounter Date: 2020 at 9:00 AM    Consent:  She and/or the health care decision maker is aware that this encounter will be billed as a routine OB appointment and that she may receive a bill for this telephone service, depending on her insurance coverage, and has provided verbal consent to proceed: Yes    Follow-up Prenatal     History of Present Illness   Intereter # [id number]    Contractions: no  LOF: no  Vaginal bleeding: no  Fetal movement (if >20 wk): yes    Is taking her ASA daily but has not been checking her BP      Vitals/Objective:   General: Patient speaking in complete sentences without effort. Normal speech and cooperative. Due to this being a Virtual Check-in/Telephone evaluation, many elements of the physical examination are unable to be assessed. Assessment and Plan:   Eber Peng is a 47 y.o.  @ 32w1d evaluated by telephone. SIUP at 30w1d  -PNL:  O +, Ab screen Negative, HIV/RPR/C/GC/Hep B Negative, VZV/Rubella Immune, initial Hgb 11.8, Hbg fract wnl, Ucx unremarkable, pap NILM. Failed 1hr, negative 3hr  -S/p Tdap and flu vaccine  -Patient encouraged to check BP once weekly  -Patient to follow up on 20 for Return OB in clinic     AMA  - ASA daily     Obesity: A1c 5.2. Failed 1 hr. 3hr wnl. Hx of CS x 2  -Repeat section is scheduled at 2021 at 9:30AM. Patient is aware to arrive fasting at 7:30AM    Late to care: at 20 weeks    Poor dating criteria: by 26 week scan    -Patient informed of her next appointment: 2020 at the clinic  -Advised to come in sooner for any concerns  -Pt informed that after 28 wk she will be asked to do weekly home BP monitoring and it was recommended she buy or borrow a cuff ahead of time.   Alternative is going to a grocery store/pharmacy to check. One BP should be checked weekly and written in a log >28 weeks.  -Patient educated on kick counts (after 28 weeks): baby should move 10 times in 2 hours (can be a kick, roll, flutter, swish). -Patient was reminded about social distancing and to avoid going into public when possible. Patient understands that this encounter was a temporary measure, and the importance of further follow up and examination was emphasized. Patient verbalized understanding. I affirm this is a Patient Initiated Episode with an Established Patient who has not had a related appointment within my department in the past 7 days or scheduled within the next 24 hours. Note: not billable if this call serves to triage the patient into an appointment for the relevant concern      Electronically Signed: Ernesto Koenig DO  Providers location when delivering service: Home      ICD-10-CM ICD-9-CM    1. 32 weeks gestation of pregnancy  Z3A.32 V22.2        Pursuant to the emergency declaration under the [facility], [facility] waiver authority and the [employer] and [employer] Act, this Virtual  Visit was conducted, with patient's consent, to reduce the patient's risk of exposure to COVID-19 and provide continuity of care for an established patient. History   Patients past medical, surgical and family histories were personally reviewed and updated. yes    There is no problem list on file for this patient. Current Medications/Allergies   Medications and Allergies reviewed:    Current Outpatient Medications   Medication Sig Dispense Refill    prenatal vit-calcium-iron-fa (PRENATAL PLUS with CALCIUM) 27 mg iron- 1 mg tab Take 1 Tab by mouth daily.       prenatal vit-iron fumarate-fa 27 mg iron- 0.8 mg tab tablet TAKE 1 TABLET BY MOUTH EVERY DAY      triamcinolone acetonide (KENALOG) 0.1 % topical cream Apply  to affected area two (2) times a day.  use thin layer 15 g 0     No Known Allergies

## 2020-11-11 ENCOUNTER — ROUTINE PRENATAL (OUTPATIENT)
Dept: FAMILY MEDICINE CLINIC | Age: 40
End: 2020-11-11
Payer: SUBSIDIZED

## 2020-11-11 DIAGNOSIS — Z3A.32 32 WEEKS GESTATION OF PREGNANCY: Primary | ICD-10-CM

## 2020-11-11 PROCEDURE — 0502F SUBSEQUENT PRENATAL CARE: CPT | Performed by: STUDENT IN AN ORGANIZED HEALTH CARE EDUCATION/TRAINING PROGRAM

## 2020-11-11 NOTE — PROGRESS NOTES
I reviewed with the resident the medical history and the resident's findings on the physical examination. I discussed with the resident the patient's diagnosis and concur with the plan. Estimated Date of Delivery: 21    39yo  at 32w1d by 26wk scan   1. IUP: RH pos, anatomy normal, s/p flu and tdap, 3' GTT and cbc ok  2. AMA: ASA, declines genetic testing, will see genetic counselor   3. Obesity: A1C 5.2   4. Late to care: 20 wk   5. Hx CS: x2, will plan for repeat at [address] [facility] given #6, scheduled  at 9:30, pt to arrive 7:30 fasting, chart sent to Ever Brown, pt aware  6.   Poor dating criteria: dating by 26wk scan

## 2020-12-05 NOTE — PROGRESS NOTES
Ady Pagan  47 y.o. female  1980  [address]  [id number]    [phone] (home)      [address]:    [facility] Telephone Encounter  Kayleigh Plascencia       Encounter Date: 2020 at 3:32 PM    Consent:  She and/or the health care decision maker is aware that this encounter will be billed as a routine OB appointment and that she may receive a bill for this telephone service, depending on her insurance coverage, and has provided verbal consent to proceed: Yes    Follow-up Prenatal   [place] # [address]elin Smith)  History of Present Illness   Contractions: no  LOF: no  Vaginal bleeding: no  Fetal movement: yes  Compliant with prenatal vitamins and ASA: yes  BP: Has not been checking her BP, she does not have a machine     History   Patients past medical, surgical and family histories were personally reviewed and updated. There is no problem list on file for this patient. Vitals/Objective:     General: Patient speaking in complete sentences without effort. Normal speech and cooperative. Due to this being a Virtual Check-in/Telephone evaluation, many elements of the physical examination are unable to be assessed. Assessment and Plan:   Ady Pagan is a 47 y.o.  @ 35w6d evaluated by telephone. SIUP:  - Labs:O +, Ab screen Negative, HIV/RPR/C/GC/Hep B Negative, VZV/Rubella Immune, initial Hgb 11.8, Hbg fract wnl, Ucx unremarkable, pap NILM. Failed 1hr, passed 3 hr  - Genetic screen: declines genetic testing  - Immunizations: S/P TDAP & Flu   - Ultrasound: anatomy normal    Pregnancy Concerns:  - AMA: ASA daily  - Obesity: A1c 5.2.  Failed 1 hr. 3hr wnl.  - Hx of CS x 2: Repeat section is scheduled at 2021 at 9:30AM. Patient is aware to arrive fasting at 7:30AM  - Late to care: at 20 weeks  - Poor dating criteria: by 26 week scan    Other information:   - Patient informed of her next in-office appointment:  at 2:15 pm with Dr. Valdo Zamora to come in sooner for any concerns  - Pt informed that after 28 wk she will be asked to do weekly home BP monitoring and it was recommended she buy or borrow a cuff ahead of time. One BP should be checked weekly and written in a log >28 weeks. - Patient educated on kick counts (after 28 weeks): baby should move 10X in 2 hours (can be a kick, roll, flutter, swish). - Patient was reminded about social distancing and to avoid going into public when possible. Patient understands that this encounter was a temporary measure, and the importance of further follow up and examination was emphasized. Patient verbalized understanding. I affirm this is a Patient Initiated Episode with an Established Patient who has not had a related appointment within my department in the past 7 days or scheduled within the next 24 hours. Note: not billable if this call serves to triage the patient into an appointment for the relevant concern      Electronically Signed: Morgan Nesbitt DO    Providers location when delivering service: home       ICD-10-CM ICD-9-CM    1. 35 weeks gestation of pregnancy  Z3A.35 V22.2    2. Late prenatal care  O09.30 V23.7    3. History of 2  sections  Z98.891 V45.89    4. High-risk pregnancy, multigravida of advanced maternal age, antepartum  O12.46 V25.80        Pursuant to the emergency declaration under the [facility], [facility] waiver authority and the [employer] and [employer] Act, this Virtual  Visit was conducted, with patient's consent, to reduce the patient's risk of exposure to COVID-19 and provide continuity of care for an established patient.         Current Medications/Allergies   Medications and Allergies reviewed:    Current Outpatient Medications   Medication Sig Dispense Refill    prenatal vit-calcium-iron-fa (PRENATAL PLUS with CALCIUM) 27 mg iron- 1 mg tab Take 1 Tab by mouth daily.  prenatal vit-iron fumarate-fa 27 mg iron- 0.8 mg tab tablet TAKE 1 TABLET BY MOUTH EVERY DAY      triamcinolone acetonide (KENALOG) 0.1 % topical cream Apply  to affected area two (2) times a day.  use thin layer 15 g 0     No Known Allergies

## 2020-12-07 ENCOUNTER — ROUTINE PRENATAL (OUTPATIENT)
Dept: FAMILY MEDICINE CLINIC | Age: 40
End: 2020-12-07
Payer: SUBSIDIZED

## 2020-12-07 DIAGNOSIS — Z98.891 HISTORY OF 2 CESAREAN SECTIONS: ICD-10-CM

## 2020-12-07 DIAGNOSIS — O09.529 HIGH-RISK PREGNANCY, MULTIGRAVIDA OF ADVANCED MATERNAL AGE, ANTEPARTUM: ICD-10-CM

## 2020-12-07 DIAGNOSIS — Z3A.35 35 WEEKS GESTATION OF PREGNANCY: Primary | ICD-10-CM

## 2020-12-07 DIAGNOSIS — O09.30 LATE PRENATAL CARE: ICD-10-CM

## 2020-12-07 PROCEDURE — 0502F SUBSEQUENT PRENATAL CARE: CPT | Performed by: STUDENT IN AN ORGANIZED HEALTH CARE EDUCATION/TRAINING PROGRAM

## 2020-12-14 ENCOUNTER — ROUTINE PRENATAL (OUTPATIENT)
Dept: FAMILY MEDICINE CLINIC | Age: 40
End: 2020-12-14
Payer: SUBSIDIZED

## 2020-12-14 VITALS
HEIGHT: 56 IN | BODY MASS INDEX: 35.14 KG/M2 | TEMPERATURE: 97.9 F | DIASTOLIC BLOOD PRESSURE: 61 MMHG | WEIGHT: 156.2 LBS | HEART RATE: 86 BPM | SYSTOLIC BLOOD PRESSURE: 99 MMHG | RESPIRATION RATE: 16 BRPM | OXYGEN SATURATION: 100 %

## 2020-12-14 DIAGNOSIS — Z98.891 HISTORY OF 2 CESAREAN SECTIONS: ICD-10-CM

## 2020-12-14 DIAGNOSIS — O09.529 ANTEPARTUM MULTIGRAVIDA OF ADVANCED MATERNAL AGE: ICD-10-CM

## 2020-12-14 DIAGNOSIS — Z3A.36 36 WEEKS GESTATION OF PREGNANCY: Primary | ICD-10-CM

## 2020-12-14 DIAGNOSIS — O09.30 LATE PRENATAL CARE: ICD-10-CM

## 2020-12-14 LAB
BILIRUB UR QL STRIP: NEGATIVE
GLUCOSE UR-MCNC: NEGATIVE MG/DL
KETONES P FAST UR STRIP-MCNC: NEGATIVE MG/DL
PH UR STRIP: 6.5 [PH] (ref 4.6–8)
PROT UR QL STRIP: NEGATIVE
SP GR UR STRIP: 1.02 (ref 1–1.03)
UA UROBILINOGEN AMB POC: NORMAL (ref 0.2–1)
URINALYSIS CLARITY POC: CLEAR
URINALYSIS COLOR POC: YELLOW
URINE BLOOD POC: NEGATIVE
URINE LEUKOCYTES POC: NEGATIVE
URINE NITRITES POC: NEGATIVE

## 2020-12-14 PROCEDURE — 0502F SUBSEQUENT PRENATAL CARE: CPT | Performed by: STUDENT IN AN ORGANIZED HEALTH CARE EDUCATION/TRAINING PROGRAM

## 2020-12-14 PROCEDURE — 81003 URINALYSIS AUTO W/O SCOPE: CPT | Performed by: STUDENT IN AN ORGANIZED HEALTH CARE EDUCATION/TRAINING PROGRAM

## 2020-12-14 RX ORDER — GUAIFENESIN 100 MG/5ML
81 LIQUID (ML) ORAL DAILY
COMMUNITY
End: 2021-01-06

## 2020-12-14 NOTE — PROGRESS NOTES
Alex Bradley is a 47 y.o. female    Chief Complaint   Patient presents with    Routine Prenatal Visit     Patient is coming in for the routine prenatal visit. She is 36 weeks and 6 days, . She is not having vaginal bleeding or discharge. She is taking her prenatal vitamins. She is having fetal movement. She is having contractions every other day. No other concerns. 1. Have you been to the ER, urgent care clinic since your last visit? Hospitalized since your last visit? No  M  2. Have you seen or consulted any other health care providers outside of the [address] [place] [address] since your last visit? Include any pap smears or colon screening. No      Visit Vitals  BP 99/61 (BP 1 Location: Right arm, BP Patient Position: Sitting)   Pulse 86   Temp 97.9 °F (36.6 °C) (Temporal)   Resp 16   Ht 4' 7.91\" (1.42 m)   Wt 156 lb 3.2 oz (70.9 kg)   SpO2 100%   BMI 35.13 kg/m²           There are no preventive care reminders to display for this patient. Medication Reconciliation completed, changes noted.   Please  Update medication list.  Vaginal Bleeding: No  Vaginal discharge: No  Fetal movement: Yes  Contractions: Yes, every other day  Prenatal Vitamins: yes

## 2020-12-14 NOTE — PROGRESS NOTES
Return OB Visit       Subjective:   Alex Bradley 47 y.o.  at [facility] LING MARCUS:  36w6d, DARIO: Estimated Date of Delivery: 21 by [address] at [address] on . Patient reports feeling well. No concerns    Pregnancy complicated by AMA, Obesity, h/o CSection x 2, and Late to Care (20w). LOF: No  Vaginal bleeding: No  Fetal movement (after 20 weeks): Yes  Contractions: No  Taking prenatal vitamins: Yes  Taking ASA: Yes      Allergies- reviewed:   No Known Allergies  Medications- reviewed:   Current Outpatient Medications   Medication Sig    aspirin 81 mg chewable tablet Take 81 mg by mouth daily.  prenatal vit-calcium-iron-fa (PRENATAL PLUS with CALCIUM) 27 mg iron- 1 mg tab Take 1 Tab by mouth daily.  prenatal vit-iron fumarate-fa 27 mg iron- 0.8 mg tab tablet TAKE 1 TABLET BY MOUTH EVERY DAY    triamcinolone acetonide (KENALOG) 0.1 % topical cream Apply  to affected area two (2) times a day. use thin layer     No current facility-administered medications for this visit. Past Medical History- reviewed:  History reviewed. No pertinent past medical history.   Past Surgical History- reviewed:   Past Surgical History:   Procedure Laterality Date    HX  SECTION       Social History- reviewed:  Social History     Socioeconomic History    Marital status: SINGLE     Spouse name: Not on file    Number of children: Not on file    Years of education: Not on file    Highest education level: Not on file   Occupational History    Not on file   Social Needs    Financial resource strain: Not on file    Food insecurity     Worry: Not on file     Inability: Not on file    Transportation needs     Medical: Not on file     Non-medical: Not on file   Tobacco Use    Smoking status: Never Smoker    Smokeless tobacco: Never Used   Substance and Sexual Activity    Alcohol use: Not Currently    Drug use: Not on file    Sexual activity: Not on file   Lifestyle    Physical activity     Days per week: Not on file     Minutes per session: Not on file    Stress: Not on file   Relationships    Social connections     Talks on phone: Not on file     Gets together: Not on file     Attends Jewish service: Not on file     Active member of club or organization: Not on file     Attends meetings of clubs or organizations: Not on file     Relationship status: Not on file    Intimate partner violence     Fear of current or ex partner: Not on file     Emotionally abused: Not on file     Physically abused: Not on file     Forced sexual activity: Not on file   Other Topics Concern    Not on file   Social History Narrative    Not on file     Immunizations- reviewed:   Immunization History   Administered Date(s) Administered    Influenza Vaccine [employer]) PF (>6 Mo Flulaval, Fluarix, and >3 Yrs Jose Alfredo Fanta [address]) 2020    Tdap 10/28/2020       OB History- reviewed:  OB History    Para Term  AB Living   3 2 2     2   SAB TAB Ectopic Molar Multiple Live Births             2      # Outcome Date GA Lbr Elijah/2nd Weight Sex Delivery Anes PTL Lv   3 Current            2 Term 10/28/04 41w0d  8 lb (3.629 kg) F CS-Unspec  N MILAGROS   1 Term 01 41w0d  10 lb (4.536 kg) F CS-Unspec  N MILAGROS      Birth Comments: CS done for transverse presentation         Objective:     Visit Vitals  BP 99/61 (BP 1 Location: Right arm, BP Patient Position: Sitting)   Pulse 86   Temp 97.9 °F (36.6 °C) (Temporal)   Resp 16   Ht 4' 7.91\" (1.42 m)   Wt 156 lb 3.2 oz (70.9 kg)   LMP 2020 (Exact Date)   SpO2 100%   BMI 35.13 kg/m²       Physical Exam:  GENERAL APPEARANCE: alert, well appearing, in no apparent distress  ABDOMEN: gravid, fundal height 37 cm, FHT present at 140 average bpm  PSYCH: normal mood and affect    Labs  Recent Results (from the past 12 hour(s))   AMB POC URINALYSIS DIP STICK AUTO W/O MICRO    Collection Time: 20  2:24 PM   Result Value Ref Range    Color (UA POC) Yellow     Clarity (UA POC) Clear Glucose (UA POC) Negative Negative    Bilirubin (UA POC) Negative Negative    Ketones (UA POC) Negative Negative    Specific gravity (UA POC) 1.020 1.001 - 1.035    Blood (UA POC) Negative Negative    pH (UA POC) 6.5 4.6 - 8.0    Protein (UA POC) Negative Negative    Urobilinogen (UA POC) 0.2 mg/dL 0.2 - 1    Nitrites (UA POC) Negative Negative    Leukocyte esterase (UA POC) Negative Negative       Assessment         ICD-10-CM ICD-9-CM    1. 36 weeks gestation of pregnancy  Z3A.36 V22.2 AMB POC URINALYSIS DIP STICK AUTO W/O MICRO      CULTURE, GENITAL GROUP B STREP         Plan   47 y.o.  at 36w6d, DARIO Estimated Date of Delivery: 21 here for return OB visit. SIUP (36w6d)   IOB labs:  O +, Ab screen Negative, HIV/RPR/C/GC/Hep B Negative, VZV/Rubella Immune, initial Hgb 11.8, Hbg fract wnl, Ucx unremarkable, pap NILM, 3' GTT ok, s/p flu, tdap   Genetic Screening: Declined   MFM Anatomy Scan: Anatomy wnl, Breech presentation, Recommended changing DARIO based on US   GBS today   Scheduled  for 40 weeks - 21 at 9:30AM, pt aware to arrive fasting 2 hours prior  o To prevent premature delivery given dating by [address] was at 26w (can be about 3 weeks off)   Encouraged to keep home BP log for telemed visit    AMA: ASA 81 mg    Obesity: A1c 5.2. 1h r GTT today    Hx of CS x2: scheduled for 40 wks due to poor dating    Late to care: 20 weeks      Orders Placed This Encounter    CULTURE, GENITAL GROUP B STREP     Standing Status:   Future     Standing Expiration Date:   2021    AMB POC URINALYSIS DIP STICK AUTO W/O MICRO    aspirin 81 mg chewable tablet     Sig: Take 81 mg by mouth daily. Labor precautions discussed, including: Regular painful contractions, lasting for greater than one hour, taking your breath away; any vaginal bleeding; any leakage of fluid; or absent or decreased fetal movement. Call M.D. on call if any of these symptoms or signs occur.     I have discussed the diagnosis with the patient and the intended plan as seen in the above orders. The patient has received an after-visit summary and questions were answered concerning future plans. I have discussed medication side effects and warnings with the patient as well. Informed pt to return to the office or go to the ER if she experiences vaginal bleeding, vaginal discharge, leaking of fluid, pelvic cramping.     Pt seen and discussed with Dr. Garcia Or (attending physician)    Rivas Marmolejo MD  Family Medicine Resident

## 2020-12-14 NOTE — PATIENT INSTRUCTIONS
Semana 40 de boyd embarazo: Instrucciones de cuidado  Week 37 of Your Pregnancy: Care Instructions  Instrucciones de cuidado    Usted está cerca del final de boyd embarazo, y probablemente esté bastante incómoda. Puede ser más difícil caminar. Acostarse probablemente tampoco sea cómodo. Podría tener dificultad para dormir o para permanecer dormida. La mayoría de las mujeres bladimir a farhat entre las 40 y 43 semanas. Sylvia es un buen momento para pensar en preparar un maletín para el hospital con los artículos que necesitará. Entonces estará lista para cuando comience el [place] clarice Ann. La atención de seguimiento es iman parte clave de boyd tratamiento y seguridad. Asegúrese de hacer y acudir a todas las citas, y llame a boyd médico si está teniendo problemas. También es iman buena idea saber los resultados de annamarie exámenes y mantener iman lista de los medicamentos que candido. ¿Cómo puede cuidarse en el hogar? Aprenda sobre el amamantamiento  · El amamantamiento es lo mejor para boyd bebé y Weston Hernandez es reynoso para usted. · La leche materna tiene anticuerpos que ayudan al bebé a combatir las infecciones. · Las madres que amamantan suelen bajar de peso más rápidamente, debido a que elaborar leche quema calorías. · Informarse acerca de las mejores maneras de sostener a boyd bebé le facilitará el amamantamiento. · Deje que boyd luis bañe y [employer] pañales del bebé para que no se sienta excluida. Acurrúquense juntos cuando amamante a boyd bebé. · Es posible que desee aprender a usar un sacaleches y guardar boyd Ap Ast. · Si elige alimentar a boyd bebé con biberón, hágalo de la [employer] resulte más parecida al amamantamiento para que pueda establecer un vínculo con boyd bebé. Siempre sostenga al bebé y el biberón. No apoye el biberón ni deje que boyd bebé se quede dormido con él. Aprenda sobre el llanto  · Es normal que los bebés lloren de 1 a 3 horas al día. Algunos lloran más, otros menos.   · Los bebés no lloran para causarle molestias ni porque usted sea [employer]. · Llorar es la forma de comunicarse que tiene el bebé. Boyd bebé puede [place] [employer] o gases, necesitar un cambio de pañal, sentir frío o calor, sentirse solo o tenso. A veces, los bebés lloran por motivos desconocidos. · Si usted responde a las necesidades de boyd bebé, pablo aprenderá a confiar en usted. · Intente mantener la calma cuando boyd bebé llore. Boyd bebé se puede sentir más molesto si siente que usted [place]. Sepa cómo cuidar a boyd recién nacido  · El muñón del cordón umbilical de boyd bebé se caerá solo, por lo general entre las semanas 1 y 2. Para cuidar la endy del cordón umbilical de boyd bebé:  ? Limpie la endy de la parte inferior del cordón umbilical 2 o 3 veces al día. ? Ponga especial atención en la endy en donde el cordón se fija a la piel. ? Mantenga el pañal doblado debajo del cordón. ? Utilice iman toallita húmeda o algodón para darle un baño de esponja a boyd bebé hasta que se le haya caído el muñón. · La primera evacuación intestinal oscura de boyd bebé se conoce ximena meconio. Después del meconio, el bebé tendrá annamarie propios hábitos de evacuación intestinal.  ? Algunos bebés, especialmente aquellos que se alimentan con [address], tienen varias evacuaciones al día. Otros tienen [employer] al día, o iman cada 2 o 3 días. ? Los bebés que son amamantados a menudo tienen evacuaciones sueltas amarillentas. Los bebés que se alimentan con leche de fórmula evacuan heces más sólidas. ? Si boyd bebé tiene evacuaciones ximena bolitas pequeñas, está estreñido. Después de 2 elida de estreñimiento, llame al médico de boyd bebé. · Si boyd bebé va a ser [place], usted puede cuidarlo en el hogar. ? Enjuáguele delicadamente el pene con agua tibia cada vez que le cambie los pañales. No intente retirar la membrana que se forma sobre el pene. Esta membrana desaparecerá por sí kurt. Séquele la endy con toques suaves de toalla.   ? [employer] a base de petróleo, ximena vaselina, en la endy del pañal que tendrá contacto con el pene de boyd bebé. [place] evitará que el pañal se le pegue al bebé. ? Pregúntele al médico acerca de darle acetaminofén (Tylenol) a boyd bebé para el dolor. ¿Dónde puede encontrar más información en inglés? Vaya a http://www.Volance.com/  Simeon april [id number] en la búsqueda para aprender más acerca de \"Semana 40 de boyd embarazo: Instrucciones de cuidado. \"  Revisado: 11 febrero, 2020               Versión del contenido: 12.6  © 3379-0980 Healthwise, Incorporated. Las instrucciones de cuidado fueron adaptadas bajo licencia por Good [employer]ctions (which disclaims liability or warranty for this information). Si usted tiene [place] afección médica o sobre estas instrucciones, siempre pregunte a boyd profesional de naveen. Healthwise, Incorporated niega toda garantía o responsabilidad por boyd uso de esta información. Ad Floor a boyd médico kuldeep el embarazo (después de 20 semanas)  Learning About When to Call Your Doctor During Pregnancy (After 20 Weeks)  Instrucciones de cuidado  Es normal que tenga inquietudes acerca de lo que podría ser un problema kuldeep el Mimi May. Aunque la mayoría de las mujeres embarazadas no tienen ningún problema grave, es importante saber cuándo llamar a boyd médico si tiene determinados síntomas o señales de trabajo de Marissa. Estas son algunas sugerencias generales. Boyd médico puede darle más información sobre cuándo llamar. Cuándo llamar a boyd médico (después de 20 semanas)  Llame al 911 en cualquier momento que considere que necesita atención de [place]. Por ejemplo, llame si:  · Tiene sangrado vaginal intenso. · Tiene dolor repentino e intenso en el abdomen. · Se desmayó (perdió el conocimiento). · Tiene iman convulsión.   · Ve o siente el cordón umbilical.  · [place] que está a punto de fei a farhat a boyd bebé y no puede llegar en forma alcazar al hospital.  Theotis Wilmer a boyd médico ahora mismo o busque atención médica inmediata si:  · Tiene sangrado vaginal.  · Tiene dolor en el abdomen. · Tiene fiebre. · Tiene síntomas de preeclampsia, ximena:  ? Hinchazón repentina de la eva, las faina o los pies. ? Nuevos problemas de visión (ximena oscurecimiento, israel borroso o israel puntos). ? Dolor de ja intenso. · Tiene iman pérdida repentina de líquido por la vagina. (Piensa que rompió la traci). · Piensa que puede driss comenzado el [place]. [place] significa que ha tenido al menos 6 contracciones en [employer]. · Nota que boyd bebé ha dejado de moverse o lo hace mucho menos de lo habitual.  · Tiene síntomas de iman infección urinaria. Estos pueden incluir:  ? Dolor o ardor al orinar. ? Necesidad de orinar con frecuencia sin poder eliminar mucha orina. ? Dolor en el flanco, que se encuentra bravo debajo de la caja torácica y [place] de la cintura en un lado de la espalda. ? [employer]. Preste especial atención a los cambios en boyd naveen y asegúrese de comunicarse con boyd médico si:  · Tiene flujo vaginal con un olor desagradable. · Tiene cambios en la piel, tales ximena:  ? Salpullido. ? Comezón. ? Color amarillento en la piel. · Tiene otras inquietudes acerca de boyd embarazo. Si tiene signos de trabajo de parto al llegar a las 37 [address] o más  Si tiene señales de [place] de parto a las 37 semanas o [place], es posible que boyd médico le diga que llame cuando boyd trabajo de parto se vuelva más [place]. Los síntomas del trabajo de parto activo incluyen:  · Contracciones que son regulares. · Contracciones a intervalos de menos de 5 minutos. · Contracciones kuldeep las cuales es difícil hablar. La atención de seguimiento es iman parte clave de boyd tratamiento y seguridad. Asegúrese de hacer y acudir a todas las citas, y llame a boyd médico si está teniendo problemas. También es iman buena idea saber los resultados de annamarie exámenes y mantener iman lista de los medicamentos que candido.   ¿Dónde puede encontrar más información en inglés? Vaya a http://babita-ryan.info/  Judit [id number] en la búsqueda para aprender más acerca de \"Aprenda cuándo llamar a boyd médico kuldeep el embarazo (después de 20 semanas). \"  Revisado: 11 febrero, 2020               Versión del contenido: 12.6  © 6230-1209 Healthwise, Incorporated. Las instrucciones de cuidado fueron adaptadas bajo licencia por Good [employer] (which disclaims liability or warranty for this information). Si usted tiene [place] afección médica o sobre estas instrucciones, siempre pregunte a boyd profesional de naveen. Healthwise, Incorporated niega toda garantía o responsabilidad por boyd uso de esta información.

## 2020-12-14 NOTE — PROGRESS NOTES
I reviewed with the resident the medical history and the resident's findings on the physical examination. I discussed with the resident the patient's diagnosis and concur with the plan. Estimated Date of Delivery: 21    39yo  at 36w6d by 26wk scan   1. IUP: RH pos, anatomy normal, s/p flu and tdap, 3' GTT and cbc ok, GBS collected  2. AMA: ASA, declines genetic testing, will see genetic counselor   3. Obesity: A1C 5.2   4. Late to care: 20 wk   5. Hx CS: x2, will plan for repeat at [address] [facility] [address], scheduled  at 9:30, pt to arrive 7:30 fasting, chart sent to [facility], pt aware  6.   Poor dating criteria: dating by 26wk scan

## 2020-12-17 LAB — GRBS, EXTERNAL: POSITIVE

## 2020-12-18 LAB
BACTERIA SPEC CULT: ABNORMAL
BACTERIA SPEC CULT: ABNORMAL
SERVICE CMNT-IMP: ABNORMAL

## 2020-12-21 ENCOUNTER — ROUTINE PRENATAL (OUTPATIENT)
Dept: FAMILY MEDICINE CLINIC | Age: 40
End: 2020-12-21
Payer: SUBSIDIZED

## 2020-12-21 VITALS
HEIGHT: 56 IN | OXYGEN SATURATION: 96 % | SYSTOLIC BLOOD PRESSURE: 100 MMHG | WEIGHT: 155.8 LBS | DIASTOLIC BLOOD PRESSURE: 63 MMHG | HEART RATE: 79 BPM | TEMPERATURE: 97.1 F | BODY MASS INDEX: 35.05 KG/M2

## 2020-12-21 DIAGNOSIS — Z3A.37 37 WEEKS GESTATION OF PREGNANCY: Primary | ICD-10-CM

## 2020-12-21 LAB
BILIRUB UR QL STRIP: NEGATIVE
GLUCOSE UR-MCNC: NEGATIVE MG/DL
KETONES P FAST UR STRIP-MCNC: NEGATIVE MG/DL
PH UR STRIP: 7 [PH] (ref 4.6–8)
PROT UR QL STRIP: NEGATIVE
SP GR UR STRIP: 1.02 (ref 1–1.03)
UA UROBILINOGEN AMB POC: NORMAL (ref 0.2–1)
URINALYSIS CLARITY POC: NORMAL
URINALYSIS COLOR POC: YELLOW
URINE BLOOD POC: NEGATIVE
URINE LEUKOCYTES POC: NORMAL
URINE NITRITES POC: NEGATIVE

## 2020-12-21 PROCEDURE — 0502F SUBSEQUENT PRENATAL CARE: CPT | Performed by: STUDENT IN AN ORGANIZED HEALTH CARE EDUCATION/TRAINING PROGRAM

## 2020-12-21 PROCEDURE — 81003 URINALYSIS AUTO W/O SCOPE: CPT | Performed by: STUDENT IN AN ORGANIZED HEALTH CARE EDUCATION/TRAINING PROGRAM

## 2020-12-21 NOTE — PROGRESS NOTES
Chief Complaint   Patient presents with    Routine Prenatal Visit     37w6d. No LOF, no bleeding. +fetal movement. +gatito villanueva every 30min     Visit Vitals  /63 (BP 1 Location: Left arm, BP Patient Position: Sitting)   Pulse 79   Temp 97.1 °F (36.2 °C) (Temporal)   Ht 4' 7.91\" (1.42 m)   Wt 155 lb 12.8 oz (70.7 kg)   SpO2 96%   BMI 35.04 kg/m²     1. Have you been to the ER, urgent care clinic since your last visit? Hospitalized since your last visit? No    2. Have you seen or consulted any other health care providers outside of the [address] [place] [address] since your last visit? Include any pap smears or colon screening.  No

## 2020-12-21 NOTE — PROGRESS NOTES
I reviewed with the resident the medical history and the resident's findings on the physical examination. I discussed with the resident the patient's diagnosis and concur with the plan. Estimated Date of Delivery: 21    41yo  at 37w6d by 26wk scan   1. IUP: RH pos, anatomy normal, s/p flu and tdap, 3' GTT and cbc ok, GBS neg, Covid collected    2. AMA: ASA, declines genetic testing, will see genetic counselor   3. Obesity: A1C 5.2   4. Late to care: 20 wk   5. Hx CS: x2, will plan for repeat at [address] [facility] given #6, scheduled  at 9:30, pt to arrive 7:30 fasting, chart sent to [facility], pt aware  6.   Poor dating criteria: dating by 26wk scan

## 2020-12-21 NOTE — PROGRESS NOTES
Return OB Visit     Jose Alfredo #[id number] used as Pt is [place]ani speaking only     Subjective:   Jose Antonio Pretty 47 y.o.   DARIO: 2021, by [facility] Client Ultrasound  GA:  37w6d. LOF: no  Vaginal bleeding: no  Fetal movement (after 20 weeks): yes  Contractions: no. Reports some gatito villanueva contractions at times    Pt denies fever, chills, HA, vision disturbances, RUQ pain, chest pain, SOB, N/V/D, constipation, urinary problems, foul smelling vaginal discharge, LE Edema worse than usual.     OB History    Para Term  AB Living   3 2 2     2   SAB TAB Ectopic Molar Multiple Live Births             2      # Outcome Date GA Lbr Elijah/2nd Weight Sex Delivery Anes PTL Lv   3 Current            2 Term 10/28/04 41w0d  8 lb (3.629 kg) F CS-Unspec  N MILAGROS   1 Term 01 41w0d  10 lb (4.536 kg) F CS-Unspec  N MILAGROS      Birth Comments: CS done for transverse presentation        Allergies- reviewed:   No Known Allergies  Medications- reviewed:   Current Outpatient Medications   Medication Sig    aspirin 81 mg chewable tablet Take 81 mg by mouth daily.  prenatal vit-calcium-iron-fa (PRENATAL PLUS with CALCIUM) 27 mg iron- 1 mg tab Take 1 Tab by mouth daily.  prenatal vit-iron fumarate-fa 27 mg iron- 0.8 mg tab tablet TAKE 1 TABLET BY MOUTH EVERY DAY    triamcinolone acetonide (KENALOG) 0.1 % topical cream Apply  to affected area two (2) times a day. use thin layer     No current facility-administered medications for this visit. Past Medical History- reviewed:  No past medical history on file.   Past Surgical History- reviewed:   Past Surgical History:   Procedure Laterality Date    HX  SECTION       Social History- reviewed:  Social History     Socioeconomic History    Marital status: SINGLE     Spouse name: Not on file    Number of children: Not on file    Years of education: Not on file    Highest education level: Not on file   Occupational History    Not on file   Social Needs  Financial resource strain: Not on file   Geoffrey-Jermaine insecurity     Worry: Not on file     Inability: Not on file   [employer] needs     Medical: Not on file     Non-medical: Not on file   Tobacco Use    Smoking status: Never Smoker    Smokeless tobacco: Never Used   Substance and Sexual Activity    Alcohol use: Not Currently    Drug use: Not on file    Sexual activity: Not on file   Lifestyle    Physical activity     Days per week: Not on file     Minutes per session: Not on file    Stress: Not on file   Relationships    Social connections     Talks on phone: Not on file     Gets together: Not on file     Attends Confucianist service: Not on file     Active member of club or organization: Not on file     Attends meetings of clubs or organizations: Not on file     Relationship status: Not on file    Intimate partner violence     Fear of current or ex partner: Not on file     Emotionally abused: Not on file     Physically abused: Not on file     Forced sexual activity: Not on file   Other Topics Concern    Not on file   Social History Narrative    Not on file     Immunizations- reviewed:   Immunization History   Administered Date(s) Administered    Influenza Vaccine [employer]) PF (>6 Mo Flulaval, Fluarix, and >3 Yrs Afluria, Fluzone 00155) 09/04/2020    Tdap 10/28/2020         Objective:     Visit Vitals  /63 (BP 1 Location: Left arm, BP Patient Position: Sitting)   Pulse 79   Temp 97.1 °F (36.2 °C) (Temporal)   Ht 4' 7.91\" (1.42 m)   Wt 155 lb 12.8 oz (70.7 kg)   LMP 04/12/2020 (Exact Date)   SpO2 96%   BMI 35.04 kg/m²       Physical Exam:  GENERAL APPEARANCE: alert, well appearing, in no apparent distress  ABDOMEN: gravid, Fundal height 37 FHT present at 145  bpm  PSYCH: normal mood and affect     Labs  Recent Results (from the past 12 hour(s))   AMB POC URINALYSIS DIP STICK AUTO W/O MICRO    Collection Time: 12/21/20  3:22 PM   Result Value Ref Range    Color (UA POC) Yellow     Clarity (UA POC) Slightly Cloudy     Glucose (UA POC) Negative Negative    Bilirubin (UA POC) Negative Negative    Ketones (UA POC) Negative Negative    Specific gravity (UA POC) 1.020 1.001 - 1.035    Blood (UA POC) Negative Negative    pH (UA POC) 7.0 4.6 - 8.0    Protein (UA POC) Negative Negative    Urobilinogen (UA POC) 0.2 mg/dL 0.2 - 1    Nitrites (UA POC) Negative Negative    Leukocyte esterase (UA POC) Trace Negative   I personally reviewed POC UA- UA negative. No signs of infection. No evidence of proteinuria         Assessment         ICD-10-CM ICD-9-CM    1. 37 weeks gestation of pregnancy  Z3A.37 V22.2 AMB POC URINALYSIS DIP STICK AUTO W/O MICRO      NOVEL CORONAVIRUS (COVID-19)         Plan   47 y.o.  37w6d DARIO 2021, by Ultrasound here for return OB visit     1. SIUP at 37w6d  -PNL:  O +, Ab screen Negative, HIV/RPR/C/GC/Hep B Negative, VZV/Rubella Immune, initial Hgb 11.8, Hbg fract wnl, Ucx unremarkable, pap NILM, 3' GTT ok,  GBS POSITIVE (final micro result addend  to read scant GBS)   -Genetic testing:declined   -Immunizations: s/p flu, tdap  -Ultrasound: Anatomy wnl, Breech presentation, Recommended changing DARIO based on US  -C/S scheduled for 21 at 9:30AM, pt aware to arrive fasting 2 hours prior  To prevent premature delivery given dating by Danae Rizo was at 26w (can be about 3 weeks off)    -COVID collected today     Follow up in 1 week     PREGNANCY CONCERNS  GBS Positive: needs Abx     AMA: ASA 81 mg     Obesity: A1c 5.2. 1hr GTT abnormal, 3hr wnl.      Hx of CS x2: scheduled for 40 wks due to poor dating     Late to care: 20 weeks    BIRTH PLAN  · Continuity Provider: ROMERO  · Social: Denies Tobacco, EtoH or illict drugs.         Orders Placed This Encounter    NOVEL CORONAVIRUS (COVID-19) ([facility] Default)     Scheduling Instructions:      1) Due to current limited availability of the COVID-19 PCR test, tests will be prioritized and may not be completed.              2) Order only if the test result will change clinical management or necessary for a return to mission-critical employment decision.              3) Print and instruct patient to adhere to CDC home isolation program. (Link Above)              4) Set up or refer patient for a monitoring program.              5) Have patient sign up for and leverage MyChart (if not previously done). Order Specific Question:   Is this test for diagnosis or screening? Answer:   Screening     Order Specific Question:   Symptomatic for COVID-19 as defined by CDC? Answer:   No     Order Specific Question:   Hospitalized for COVID-19? Answer:   No     Order Specific Question:   Admitted to ICU for COVID-19? Answer:   No     Order Specific Question:   Employed in healthcare setting? Answer:   No     Order Specific Question:   Resident in a congregate (group) care setting? Answer:   No     Order Specific Question:   Pregnant? Answer:   Yes     Order Specific Question:   Previously tested for COVID-19? Answer:   No    AMB POC URINALYSIS DIP STICK AUTO W/O MICRO     Labor precautions discussed, including: Regular painful contractions, lasting for greater than one hour, taking your breath away; any vaginal bleeding; any leakage of fluid; or absent or decreased fetal movement. Call M.D. on call if any of these symptoms or signs occur. I have discussed the diagnosis with the patient and the intended plan as seen in the above orders. The patient has received an after-visit summary and questions were answered concerning future plans. I have discussed medication side effects and warnings with the patient as well. Informed pt to return to the office or go to the ER if she experiences vaginal bleeding, vaginal discharge, leaking of fluid, pelvic cramping.     Pt seen and discussed with Dr. Dina Pike (attending physician)    El Hugo DO  Family Medicine Resident

## 2020-12-21 NOTE — PATIENT INSTRUCTIONS
Semana 38 de boyd embarazo: Instrucciones de cuidado  Week 38 of Your Pregnancy: Care Instructions  Instrucciones de cuidado    Aunque no lo crea, boyd bebé está por nacer. Es posible que ya tenga ideas acerca de la personalidad de boyd bebé debido a cuánto se mueve. O shekhar vez haya notado cómo responde a los sonidos, al calor, al frío y a la farhat. Incluso podría saber qué tipo de [employer] a boyd bebé. A estas [place], usted tiene [address] idea de qué puede esperar kuldeep el [place]. Es posible que haya hablado de annamarie preferencias del nacimiento con boyd médico. Nikita incluso si usted quiere un nacimiento por vía vaginal, es iman buena idea aprender [employer] nacimientos por cesárea. Los partos por cesárea son [employer] bebés nacen por medio de un mikie (incisión) hecho en la parte inferior del abdomen. A veces, es la mejor opción para la naveen del bebé y de [place]. Esta hoja de cuidados puede ayudarla a entender los nacimientos por cesárea. También le da información sobre qué esperar después del nacimiento de boyd bebé. Y la ayuda a comprender [employer] depresión posparto. La atención de seguimiento es iman parte clave de boyd tratamiento y seguridad. Asegúrese de hacer y acudir a todas las citas, y llame a boyd médico si está teniendo problemas. También es iman buena idea saber los resultados de annamarie exámenes y mantener iman lista de los medicamentos que candido. ¿Cómo puede cuidarse en el hogar? Aprenda sobre el parto por cesárea  · La mayoría de los partos por cesárea no están planeados. Se hacen por problemas que se producen kuldeep el trabajo de [place]. Estos problemas podrían incluir:  ? El [place] de parto se vuelve más lento o se detiene. ? Presión arterial cem u otros problemas para la madre.  ? Señales de sufrimiento del bebé. Estas señales pueden incluir iman frecuencia cardíaca muy rápida o lenta.   · [place] y los bebés están reyes después de un parto por Julee Paramon cesárea es iman Paige Mervin. Tiene más riesgos que un parto vaginal.  · En algunos casos, un parto por cesárea planificado puede ser más seguro que un parto vaginal. [place] puede ser el nelly si:  ? La madre tiene un problema de naveen, ximena iman afección cardíaca. ? El bebé no está en posición con la ja para abajo para el parto. Esta posición se llama de nalgas. ? El útero tiene cicatrices de cirugías anteriores. [place] podría aumentar la probabilidad de un desgarro en el [place]. ? Hay un problema con la placenta. ? La madre tiene iman infección, ximena herpes genital, que podría transmitirse al bebé. ? La madre está embarazada con mellizos o más bebés. ? El bebé pesa de 9 a 8 libras o más. · Debido a los riesgos del parto por cesárea, las cesáreas planeadas deberían hacerse generalmente solo por motivos médicos. Y iman cesárea planeada debería hacerse en la semana 44 o más tarde denis que haya un motivo médico para hacerla antes. Sepa lo que ocurrirá después del parto y cómo planificar las primeras semanas en boyd hogar  · Usted, boyd bebé y boyd luis o instructor Dafne Cordial bandas de identificación. Solo las personas que tengan bandas que coincidan podrán retirar al bebé de la dre de recién nacidos. · Aprenderá a alimentar a boyd bebé, cambiar el pañal y bañar al bebé. [place] a cuidar del muñón del cordón umbilical. Si Ovi Bollard a circuncidar a boyd bebé, también aprenderá a cuidar iman circuncisión. · Pídale a las visitas que esperen a visitarla cuando esté en boyd hogar. Y pídales que se laven las faina antes de tocar al bebé. · Asegúrese de tener otro adulto en casa por lo menos 2 o 3 días después del [place]. · Sissy las primeras 2 semanas, limite las visitas de familiares y amigos. · No permita visitantes que tengan resfriados o infecciones. Asegúrese de que todos los visitantes estén al día con annamarie vacunas. Nunca deje que nadie fume cerca de boyd bebé. · Trate de dormir iman siesta cuando boyd bebé duerma.   Sea consciente de la depresión posparto  · La \"melancolía de la maternidad\" es común en las primeras 1 o 2 semanas después del [place]. Es posible que tenga ganas de llorar o se sienta laurel o irritable sin motivo alguno. · En algunas mujeres, estas emociones mahan más tiempo y son más intensas. A esto se lo conoce ximena depresión posparto. · Si annamarie síntomas mahan más de unas pocas semanas, o si se siente muy deprimida, pídale ayuda a boyd médico.  · La depresión posparto sí puede tratarse. Los grupos de apoyo y la asesoría psicológica pueden ser de [facility]. A veces, los medicamentos también pueden ayudar. ¿Dónde puede encontrar más información en [facility]? Vaya a [email or web address]-ryan.[email or web address]  Job Limb B044 en la búsqueda para aprender más acerca de \"Semana 45 de boyd embarazo: Instrucciones de cuidado. \"  Revisado: 11 febrero, 2020               Versión del contenido: 12.6  © 8207-8888 Healthwise, Incorporated. Las instrucciones de cuidado fueron adaptadas bajo licencia por [facility] Connections (which disclaims liability or warranty for this information). Si usted tiene [place] afección médica o sobre estas instrucciones, siempre pregunte a boyd profesional de naveen. Healthwise, Incorporated niega toda garantía o responsabilidad por boyd uso de esta información. Precauciones en el embarazo: Instrucciones de cuidado  Pregnancy Precautions: Care Instructions  Instrucciones de cuidado    No hay iman manera alcazar de prevenir el trabajo de parto antes de la fecha esperada (trabajo de parto prematuro) o de prevenir la mayoría de otros problemas en el [facility]. Nikita hay cosas que puede hacer para aumentar las probabilidades de tener un embarazo saludable. Vaya a annamarie citas, siga los consejos de boyd médico y cuídese. Coma reyes y albert ejercicio (si boyd médico lo permite). Y asegúrese de pablo abundante agua. La atención de seguimiento es iman parte clave de boyd tratamiento y seguridad.  Asegúrese de hacer y acudir a todas las citas, y llame a boyd médico si está teniendo problemas. También es iman buena idea saber los resultados de annamarie exámenes y mantener iman lista de los medicamentos que candido. ¿Cómo puede cuidarse en el hogar? · Asegúrese de asistir a las citas prenatales. Boyd médico le tomará la presión arterial en cada consulta. Boyd médico también comprobará si tiene proteínas en boyd orina. Tanto la presión arterial cem ximena la presencia de proteínas en la orina son señales de preeclampsia. Esta afección puede ser peligrosa tanto para usted ximena para boyd bebé. · Helen abundantes líquidos, suficientes para que boyd orina sea de color amarillo siddhartha o transparente ximena el agua. La deshidratación puede causar contracciones. Si tiene [facility], el corazón o el hígado y tiene que [place] líquidos, hable con boyd médico antes de aumentar boyd consumo. · Notifique a boyd médico de inmediato si presenta cualquier síntoma de infección, tales ximena:  ? Ardor cuando orina. ? Flujo con mal olor de la vagina. ? Comezón en la vagina. ? Brunetta Profit sin explicación. ? Dolor o sensibilidad inusual en el útero o la parte baja del abdomen. · Aliméntese en forma equilibrada. Incluya muchos alimentos que roxi ricos en calcio y xochitl. ? Entre los alimentos ricos en calcio se incluyen la [place], el queso, el yogjimbo, Rashawn Taylor y el brócoli. ? Entre los alimentos ricos en xochitl se incluyen las jessi mckeon, los [place], las aves, los [place], los frijoles, las uvas pasas, el pan de grano integral y las verduras de hojas verdes. · No fume. Si necesita ayuda para dejar de fumar, hable con boyd médico sobre programas y medicamentos para dejar de fumar. Estos pueden aumentar annamarie probabilidades de dejar el hábito para siempre. · No helen alcohol ni use drogas ilegales. · Siga las instrucciones de boyd médico acerca de la [place]. Boyd médico le dirá cuánto ejercicio puede hacer.   · Pregúntele a boyd médico si puede tener relaciones sexuales. Si usted está en riesgo de tener trabajo de [place], boyd médico podría pedirle que no tenga relaciones sexuales. · [place] precauciones para prevenir las caídas. Sissy el embarazo las articulaciones están más sueltas y se tiene menos equilibrio. Los deportes tales ximena el ciclismo, el esquí o el patinaje en línea pueden aumentar el riesgo de caídas. Y no monte a kirk, prosper en motocicleta, albert clavados, albert esquí acuático, bucee, ni salte en paracaídas mientras está embarazada. · Evite calentarse demasiado. No use saunas ni bañeras de hidromasaje. Evite la exposición al sol en climas calientes por mucho tiempo. [place] acetaminofén (Tylenol) para bajar iman fiebre cem. · No tome medicamentos de venta han, productos herbarios ni suplementos sin hablar tiago con byod médico o farmacéutico.  ¿Cuándo debe pedir ayuda? Llame al 911 en cualquier momento que considere que necesita atención de [place]. Por ejemplo, llame si:    · Se desmayó (perdió el conocimiento).     · Tiene convulsiones.     · Tiene sangrado vaginal intenso.     · Tiene dolor intenso en el abdomen o la pelvis.     · Le sale abundante líquido o gotea de la vagina y sabe o augie que el cordón umbilical se está saliendo a boyd vagina. Si esto sucede, arrodíllese de inmediato, de shekhar forma que annamarie nalgas estén más altas que boyd ja. [place] disminuirá la presión sobre el cordón umbilical hasta que llegue la [facility]. Llame a boyd médico ahora mismo o busque atención médica inmediata si:    · Tiene señales de preeclampsia, ximena:  ? Hinchazón repentina de la eva, las faina o los pies. ? Nuevos problemas de visión (ximena oscurecimiento, israel borroso o israel puntos). ? Dolor de ja intenso.     · Tiene cualquier sangrado vaginal.     · Tiene dolor o cólicos abdominales.     · Tiene fiebre.     · Loel Sandoval tenido contracciones regulares (con o sin dolor) por Vanessa Pepper.  [place] significa que tiene 8 o más contracciones en 1 hora o que tiene 4 contracciones o más en 20 minutos después de [place] de posición y pablo líquidos.     · Tiene iman pérdida repentina de líquido por la vagina.     · Tiene dolor en la parte baja de la espalda o presión en la pelvis que no desaparece.     · Nota que boyd bebé ha dejado de moverse o se mueve mucho menos de lo normal.   Preste especial atención a los cambios en boyd naveen y asegúrese de comunicarse con boyd médico si tiene algún problema. ¿Dónde puede encontrar más información en inglés? [employer] a [email or web address].gray.com/  Judit [id number] en la búsqueda para aprender más acerca de \"Precauciones en el embarazo: Instrucciones de cuidado. \"  Revisado: 11 febrero, 2020               Versión del contenido: 12.6  © 8284-0358 Healthwise, Incorporated. Las instrucciones de cuidado fueron adaptadas bajo licencia por [employer] Connections (which disclaims liability or warranty for this information). Si usted tiene [place] afección médica o sobre estas instrucciones, siempre pregunte a boyd profesional de naveen. Healthwise, Incorporated niega toda garantía o responsabilidad por boyd uso de esta información. Conteo de las patadas de boyd bebé: Instrucciones de cuidado  [employer] Your Baby's Kicks: Care Instructions  Instrucciones de cuidado    Contar las patadas de boyd bebé es iman manera en la que boyd médico puede establecer si boyd bebé es saludable. La mayoría de las [facility], sobre todo en el primer embarazo, siente que boyd bebé se mueve por primera vez entre las semanas 12 y 25. El movimiento puede sentirse más ximena aleteos que ximena patadas. Es posible que boyd bebé se mueva más a ciertas horas del día. Cuando usted [place], puede notar menos patadas que cuando está descansando. En annamarie visitas prenatales, boyd médico le preguntará si el bebé está activo. En el último trimestre, boyd médico le puede pedir que cuente la cantidad de veces que sienta que el bebé se [place].   La atención de seguimiento es iman parte clave de boyd tratamiento y seguridad. Asegúrese de hacer y acudir a todas las citas, y llame a boyd médico si está teniendo problemas. También es iman buena idea saber los resultados de annamarie exámenes y mantener iman lista de los medicamentos que candido. ¿Cómo se cuentan las patadas fetales? · Un método común para revisar el movimiento de boyd bebé es contar la cantidad de patadas o movimientos que sienta en 1 hora. Es normal sentir 10 movimientos (ximena patadas, aleteos o vueltas) en 1 hora. Algunos médicos sugieren que cuente kuldeep la [facility] llegar a los 10 movimientos. Luego usted puede dejar de contar por arun día y comenzar otra vez al día siguiente. · Para contar, elija el momento del día en que boyd bebé esté más activo. Podría ser cualquier momento entre la mañana y la noche. · Si no siente 10 movimientos en iman hora, es posible que boyd bebé esté durmiendo. Espere hasta la próxima hora y vuelva a contar. ¿Cuándo debe pedir ayuda? Llame a boyd médico ahora mismo o busque atención médica inmediata si:    · Siente que boyd bebé ha dejado de moverse o se mueve mucho menos de lo normal.   Preste especial atención a los cambios en boyd naveen y asegúrese de comunicarse con boyd médico si tiene cualquier problema. ¿Dónde puede encontrar más información en inglés? Patterson Hugo a [email or web address]/  Derrick Wiley P799 en la búsqueda para aprender más acerca de \"Conteo de las patadas de boyd bebé: Instrucciones de cuidado. \"  Revisado: 11 febrero, 2020               Versión del contenido: 12.6  © 2211-3423 Healthwise, Incorporated. Las instrucciones de cuidado fueron adaptadas bajo licencia por Good Help Connections (which disclaims liability or warranty for this information). Si usted tiene [place] afección médica o sobre estas instrucciones, siempre pregunte a boyd profesional de naveen. [place], [employer] niega toda garantía o responsabilidad por boyd uso de esta información.       Contracciones de Suzzane Lusby: Instrucciones de cuidado  Suzzane Lusby Contractions: Care Instructions  Instrucciones de cuidado    Las contracciones de [place] Vizcarra le preparan el útero para el [place] de [place]. Piense en ellas ximena si fueran un ejercicio de \"precalentamiento\" que hace boyd cuerpo. Puede comenzar a sentirlas [employer] 28 y 27 del embarazo. Nikita comienzan tan temprano ximena en la semana 20. Las contracciones de Suzzane Lusby por lo general ocurren con mayor frecuencia kuldeep el noveno mes. Pueden desaparecer cuando usted Jerlyn Slim y regresar cuando descansa. Estas contracciones son ximena contracciones leves del [place] de [place]issa real, aunque ocurren con sunshine frecuencia. (Usted siente menos de 8 en Orvis Combe). No causan dilatación del rodrigo uterino. Puede resultarle difícil diferenciar [employer] contracciones de Suzzane Lusby y el trabajo de parto real, sobre todo kuldeep el primer Jeraldene Yesenia. La atención de seguimiento es iman parte clave de boyd tratamiento y seguridad. Asegúrese de hacer y acudir a todas las citas, y llame a boyd médico si está teniendo problemas. También es iman buena idea saber los resultados de annamarie exámenes y mantener iman lista de los medicamentos que candido. ¿Cómo puede cuidarse en el hogar? · Pruebe un baño tibio para ayudar a aliviar la tensión muscular y reducir el dolor. · Cambie de posición cada 30 minutos. Tómese descansos si tiene que estar sentada por [employer]. Levántese a caminar. · Helen abundante agua, suficiente ximena para que boyd orina sea de color amarillo siddhartha o britta ximena el [place]. · Hacer unas caminatas cortas puede ayudarla a sentirse mejor. Las contracciones que se vuelvan más lisette o más frecuentes deben ser evaluadas por boyd médico.  ¿Dónde puede encontrar más información en inglés? Jovany Mention a [email or web address]  Luiza Filter [id number] en la búsqueda para aprender más acerca de \"Contracciones de Suzzane Lusby: Instrucciones de cuidado. \"  Kellie Jacques: 11 febrero, 2020               Versión del contenido: 12.6  © 9874-4033 [employer]. Las instrucciones de cuidado fueron adaptadas bajo licencia por Good Help Connections (which disclaims liability or warranty for this information). Si usted tiene [place] afección médica o sobre estas instrucciones, siempre pregunte a boyd profesional de naveen. [employer] niega toda garantía o responsabilidad por boyd uso de esta información.

## 2020-12-22 ENCOUNTER — TELEPHONE (OUTPATIENT)
Dept: FAMILY MEDICINE CLINIC | Age: 40
End: 2020-12-22

## 2020-12-23 LAB — SARS-COV-2, COV2NT: NOT DETECTED

## 2020-12-23 NOTE — TELEPHONE ENCOUNTER
GBS initially negative, but then scant beta strep ended up growing. Therefore, GBS positive.       Kimberli Collins MD  Family Medicine Resident

## 2020-12-23 NOTE — PROGRESS NOTES
I was notified that GBS Micro culture updated on 12/22 to read POSITIVE. I have addend my note from 12/21 office visit.

## 2020-12-23 NOTE — PROGRESS NOTES
Final result with scant beta strep, indicating positive.       Murel Hammans, MD  Family Medicine Resident

## 2020-12-28 ENCOUNTER — ROUTINE PRENATAL (OUTPATIENT)
Dept: FAMILY MEDICINE CLINIC | Age: 40
End: 2020-12-28
Payer: SUBSIDIZED

## 2020-12-28 DIAGNOSIS — Z98.891 HISTORY OF 2 CESAREAN SECTIONS: ICD-10-CM

## 2020-12-28 DIAGNOSIS — Z3A.38 38 WEEKS GESTATION OF PREGNANCY: Primary | ICD-10-CM

## 2020-12-28 DIAGNOSIS — O09.529 ANTEPARTUM MULTIGRAVIDA OF ADVANCED MATERNAL AGE: ICD-10-CM

## 2020-12-28 DIAGNOSIS — O09.30 LATE PRENATAL CARE: ICD-10-CM

## 2020-12-28 DIAGNOSIS — B95.1 POSITIVE GBS TEST: ICD-10-CM

## 2020-12-28 PROCEDURE — 0502F SUBSEQUENT PRENATAL CARE: CPT | Performed by: STUDENT IN AN ORGANIZED HEALTH CARE EDUCATION/TRAINING PROGRAM

## 2020-12-28 PROCEDURE — 59426 ANTEPARTUM CARE ONLY: CPT | Performed by: FAMILY MEDICINE

## 2020-12-28 NOTE — PROGRESS NOTES
Kathy Sommers  47 y.o. female  1980  [address] [id number] (home)      [address]:    [facility] Telephone Encounter  Gabriella Barrios [place]       Encounter Date: 2020 at 2:05 PM    Consent:  She and/or the health care decision maker is aware that this encounter will be billed as a routine OB appointment and that she may receive a bill for this telephone service, depending on her insurance coverage, and has provided verbal consent to proceed: Yes    Follow-up Prenatal     Stratus #[id number] used as Pt is Mongolian speaking only       History of Present Illness   Contractions: no  LOF: no  Vaginal bleeding: no  Fetal movement: yes    Pt denies fever, chills, HA, vision disturbances, RUQ pain, chest pain, SOB, N/V, urinary problems, vaginal bleeding, foul smelling vaginal discharge. Has not been checking BP at home. States she Is compliant with ASA. COVID screening: NEGATIVE. Pt denies sick contacts or recent travel. Denies recent illness, fever, chills, sore throat, vision changes, HA, dizziness, cough , SOB, chest pain, dysuria, n/v/d, abdominal pain or extremity edema. Vitals/Objective:   General: Patient speaking in complete sentences without effort. Normal speech and cooperative. Due to this being a Virtual Check-in/Telephone evaluation, many elements of the physical examination are unable to be assessed. Assessment and Plan:   Kathy Sommers is a 47 y.o.  @ 38w6d dated by 26wk scan evaluated by telephone. PNL:  O +, Ab screen Negative, HIV/RPR/C/GC/Hep B Negative, VZV/Rubella Immune, initial Hgb 11.8, Hbg fract wnl, Ucx unremarkable, pap NILM, 3' GTT ok,  GBS POSITIVE (final micro result addend  to read scant GBS), COVID NEG   -Genetic testing:declined   -Immunizations: s/p flu, tdap  -Ultrasound: Anatomy wnl, Breech presentation, Recommended changing DARIO based on US.  F/u CI   -C/S scheduled for 21 at 9:30AM, pt aware to arrive fasting 2 hours prior. To prevent premature delivery given dating by [address] was at 26w (can be about 3 weeks off)    PREGNANCY CONCERNS  GBS Positive: needs Abx      AMA: ASA 81 mg     Obesity: A1c 5.2. 1hr GTT abnormal, 3hr wnl.      Hx of CS x2: scheduled for 40 wks due to poor dating     Late to care: ~20 weeks     BIRTH PLAN  ? Continuity Provider: ROMERO  ? Social: Denies Tobacco, EtoH or illict drugs.         -Patient informed of her next in-office appointment: NA- scheduled for  next week. -Advised to come in sooner for any concerns  -Pt informed that after 28 wk she will be asked to do weekly home BP monitoring and it was recommended she buy or borrow a cuff ahead of time. Alternative is going to a grocery store/pharmacy to check. One BP should be checked weekly and written in a log >28 weeks.  -Patient educated on kick counts (after 28 weeks): baby should move 10 times in 2 hours (can be a kick, roll, flutter, swish). -Patient was reminded about social distancing and to avoid going into public when possible. Patient understands that this encounter was a temporary measure, and the importance of further follow up and examination was emphasized. Patient verbalized understanding. I affirm this is a Patient Initiated Episode with an Established Patient who has not had a related appointment within my department in the past 7 days or scheduled within the next 24 hours. Note: not billable if this call serves to triage the patient into an appointment for the relevant concern      Electronically Signed: Muna Ashton DO  Providers location when delivering service: HOME       ICD-10-CM ICD-9-CM    1. 38 weeks gestation of pregnancy  Z3A.38 V22.2    2. Late prenatal care  O09.30 V23.7    3. History of 2  sections  Z98.891 V45.89    4. Positive GBS test  B95.1 041.02    5.  Antepartum multigravida of advanced maternal age O09.529 659.63        Pursuant to the emergency declaration under the [facility] [place] [address] waiver authority and the [employer] Act, this Virtual  Visit was conducted, with patient's consent, to reduce the patient's risk of exposure to COVID-19 and provide continuity of care for an established patient. History   Patients past medical, surgical and family histories were personally reviewed and updated. There is no problem list on file for this patient. Current Medications/Allergies   Medications and Allergies reviewed:    Current Outpatient Medications   Medication Sig Dispense Refill    aspirin 81 mg chewable tablet Take 81 mg by mouth daily.  prenatal vit-calcium-iron-fa (PRENATAL PLUS with CALCIUM) 27 mg iron- 1 mg tab Take 1 Tab by mouth daily.  prenatal vit-iron fumarate-fa 27 mg iron- 0.8 mg tab tablet TAKE 1 TABLET BY MOUTH EVERY DAY      triamcinolone acetonide (KENALOG) 0.1 % topical cream Apply  to affected area two (2) times a day.  use thin layer 15 g 0     No Known Allergies

## 2020-12-28 NOTE — PATIENT INSTRUCTIONS
Precauciones en el embarazo: Instrucciones de cuidado Pregnancy Precautions: Care Instructions Instrucciones de cuidado No hay iman manera alcazar de prevenir el trabajo de parto antes de la fecha esperada (trabajo de parto prematuro) o de prevenir la mayoría de otros problemas en el [facility]. Nikita hay cosas que puede hacer para aumentar las probabilidades de tener un embarazo saludable. Vaya a annamarie citas, siga los consejos de boyd médico y cuídese. Coma reyes y albert ejercicio (si boyd médico lo permite). Y asegúrese de pablo abundante agua. La atención de seguimiento es iman parte clave de boyd tratamiento y seguridad. Asegúrese de hacer y acudir a todas las citas, y llame a boyd médico si está teniendo problemas. También es iman buena idea saber los resultados de annamarie exámenes y mantener iman lista de los medicamentos que candido. Cómo puede cuidarse en el hogar? · Asegúrese de asistir a las citas prenatales. Boyd médico le tomará la presión arterial en cada consulta. Boyd médico también comprobará si tiene proteínas en boyd orina. Tanto la presión arterial cem ximena la presencia de proteínas en la orina son señales de preeclampsia. Esta afección puede ser peligrosa tanto para usted ximena para boyd bebé. · Helen abundantes líquidos, suficientes para que boyd orina sea de color amarillo siddhartha o transparente ximena el agua. La deshidratación puede causar contracciones. Si tiene [employer], el corazón o el hígado y tiene que Jw's líquidos, hable con boyd médico antes de aumentar boyd consumo. · Notifique a boyd médico de inmediato si presenta cualquier síntoma de infección, tales ximena: ? Ardor cuando orina. ? Flujo con mal olor de la vagina. ? Comezón en la vagina. ? Anh Bickers sin explicación. ? Dolor o sensibilidad inusual en el útero o la parte baja del abdomen. · Aliméntese en forma equilibrada. Incluya muchos alimentos que roxi ricos en calcio y xochitl. ? [employer] ricos en calcio se incluyen la [place], el queso, el yogur, Josiah Lanier y el keven. ? Entre los alimentos ricos en xochitl se incluyen las jessi mckeon, los [place], las aves, los [place], los frijoles, las uvas pasas, el pan de grano integral y las verduras de hojas verdes. · No fume. Si necesita ayuda para dejar de fumar, hable con boyd médico sobre programas y medicamentos para dejar de fumar. Estos pueden aumentar annamarie probabilidades de dejar el hábito para siempre. · No ada alcohol ni use drogas ilegales. · Siga las instrucciones de boyd médico acerca de la [place]. Boyd médico le dirá cuánto ejercicio puede hacer. · Pregúntele a boyd médico si puede tener [employer]. Si usted está en riesgo de tener trabajo de [place], boyd médico podría pedirle que no tenga relaciones sexuales. · [place] precauciones para prevenir las caídas. Sissy el embarazo las articulaciones están más sueltas y se tiene menos equilibrio. Los deportes tales ximena el ciclismo, el esquí o el patinaje en línea pueden aumentar el riesgo de caídas. Y no monte a kirk, prosper en motocicleta, albert clavados, albert esquí acuático, bucee, ni salte en paracaídas mientras está embarazada. · Evite calentarse demasiado. No use saunas ni bañeras de hidromasaje. Evite la exposición al sol en climas calientes por mucho tiempo. [place] acetaminofén (Tylenol) para bajar iman fiebre cem. · No tome medicamentos de venta han, productos herbarios ni suplementos sin hablar tiago con boyd médico o farmacéutico. 

Cuándo debe pedir ayuda? Llame al 911 en cualquier momento que considere que necesita atención de [place]. Por ejemplo, llame si: 
  · Se desmayó (perdió el conocimiento).  
  · Tiene convulsiones.  
  · Tiene sangrado vaginal intenso.  
  · Tiene dolor intenso en el abdomen o la pelvis.   · Le sale abundante líquido o gotea de la vagina y sabe o augie que el cordón umbilical se está saliendo a boyd vagina. Si esto sucede, arrodíllese de inmediato, de shekhar forma que annamarie nalgas estén más altas que boyd ja. [place] disminuirá la presión sobre el cordón umbilical hasta que llegue la [facility]. Llame a boyd médico ahora mismo o busque atención médica inmediata si: 
  · Tiene señales de preeclampsia, ximena: 
? Hinchazón repentina de la eva, las faina o los pies. ? Nuevos problemas de visión (ximena oscurecimiento, israel borroso o israel puntos). ? Dolor de ja intenso.  
  · Tiene cualquier sangrado vaginal.  
  · Tiene dolor o cólicos abdominales.  
  · Tiene fiebre.  
  · [place] tenido contracciones regulares (con o sin dolor) por Fly Sarath. [place] significa que tiene 8 o más contracciones en 1 hora o que tiene 4 contracciones o más en 20 minutos después de [place] de posición y pablo líquidos.  
  · Tiene iman pérdida repentina de líquido por la vagina.  
  · Tiene dolor en la parte baja de la espalda o presión en la pelvis que no desaparece.  
  · Nota que boyd bebé ha dejado de moverse o se mueve mucho menos de lo normal.  
Preste especial atención a los cambios en boyd naveen y asegúrese de comunicarse con boyd médico si tiene algún problema. Dónde puede encontrar más información en inglés? Suhail Banks a [email or web address] Chikis Class D651 en la búsqueda para aprender más acerca de \"Precauciones en el embarazo: Instrucciones de cuidado. \" Revisado: 11 febrero, 2020               Versión del contenido: 12.6 © 3567-4640 [employer], Incorporated. Las instrucciones de cuidado fueron adaptadas bajo licencia por Good Help Connections (which disclaims liability or warranty for this information). Si usted tiene [place] afección médica o sobre estas instrucciones, siempre pregunte a boyd profesional de naveen. [facility], Incorporated niega toda garantía o responsabilidad por boyd uso de esta información. Contracciones de [place]: Instrucciones de cuidado [place] Contractions: Care Instructions Instrucciones de cuidado Las contracciones de [place] le preparan el útero para el [place]. Piense en ellas ximena si fueran un ejercicio de \"precalentamiento\" que hace boyd cuerpo. Puede comenzar a sentirlas [employer] 28 y 27 del embarazo. Nikita comienzan tan temprano ximena en la semana 20. Las contracciones de [place] por lo general ocurren con mayor frecuencia kuldeep el noveno mes. Pueden desaparecer cuando usted Gibran Saner y regresar cuando descansa. Estas contracciones son ximena contracciones leves del [place] real, aunque ocurren con sunshine frecuencia. (Usted siente menos de 8 en [facility]). No causan dilatación del rodrigo uterino. Puede resultarle difícil diferenciar [employer] contracciones de [place] y el trabajo de parto real, sobre todo kuldeep el primer [facility]. La atención de seguimiento es iman parte clave de boyd tratamiento y seguridad. Asegúrese de hacer y acudir a todas las citas, y llame a boyd médico si está teniendo problemas. También es iman buena idea saber los resultados de annamarie exámenes y mantener iman lista de los medicamentos que candido. Cómo puede cuidarse en el hogar? · Pruebe un baño tibio para ayudar a aliviar la tensión muscular y reducir el dolor. · Cambie de posición cada 30 minutos. Tómese descansos si tiene que estar sentada por [employer]. Levántese a caminar. · Helen abundante agua, suficiente ximena para que boyd orina sea de color amarillo siddhartha o britta ximena el [place]. · Hacer unas caminatas cortas puede ayudarla a sentirse mejor. Las contracciones que se vuelvan más lisette o más frecuentes deben ser evaluadas por boyd médico. 

Dónde puede encontrar más información en inglés? Sandi denny [email or web address] Hermila Loza S996 en la búsqueda para aprender más acerca de \"Contracciones de [place]: Instrucciones de cuidado. \" Revisado: 11 febrero, 2020               Versión del contenido: 12.6 © 7270-3024 Healthwise, Incorporated. Las instrucciones de cuidado fueron adaptadas bajo licencia por Good [employer] Response Connections (which disclaims liability or warranty for this information). Si usted tiene [place] afección médica o sobre estas instrucciones, siempre pregunte a boyd profesional de naveen. Healthwise, Incorporated niega toda garantía o responsabilidad por boyd uso de esta información. Semana 38 de boyd embarazo: Instrucciones de cuidado Week 38 of Your Pregnancy: Care Instructions Instrucciones de cuidado Aunque no lo crea, boyd bebé está por nacer. Es posible que ya tenga ideas acerca de la personalidad de boyd bebé debido a cuánto se mueve. O shekhar vez haya notado cómo responde a los sonidos, al calor, al frío y a la farhat. Incluso podría saber qué tipo de [place] a boyd bebé. A estas [place], usted tiene [place] [address] idea de qué puede esperar kuldeep el [place]. Es posible que haya hablado de annamarie preferencias del nacimiento con boyd médico. Nikita incluso si usted quiere un nacimiento por vía vaginal, es iman buena idea aprender [employer] nacimientos por cesárea. Los partos por cesárea son [employer] bebés nacen por medio de un mikie (incisión) hecho en la parte inferior del abdomen. A veces, es la mejor opción para la naveen del bebé y de [place]. Esta hoja de cuidados puede ayudarla a entender los nacimientos por cesárea. También le da información sobre qué esperar después del nacimiento de boyd bebé. Y la ayuda a comprender [employer] depresión posparto. La atención de seguimiento es iman parte clave de boyd tratamiento y seguridad. Asegúrese de hacer y acudir a todas las citas, y llame a boyd médico si está teniendo problemas. También es iman buena idea saber los resultados de annamarie exámenes y mantener iman lista de los medicamentos que candido. Cómo puede cuidarse en el hogar? Aprenda sobre el parto por [place] · La mayoría de los partos por cesárea no están planeados. Se hacen por problemas que se producen kuldeep el trabajo de [place]. Estos problemas podrían incluir: ? El [place] de parto se vuelve más lento o se detiene. ? Presión arterial cem u otros problemas para la madre. 
? Señales de sufrimiento del bebé. Estas señales pueden incluir iman frecuencia cardíaca muy rápida o lenta. · [place] y los bebés están reyes después de un parto por cesárea, la cesárea es iman Arabella Catena. Tiene más riesgos que un parto vaginal. 
· En algunos casos, un parto por cesárea planificado puede ser más seguro que un parto vaginal. [place] puede ser el nelly si: 
? La madre tiene un problema de naveen, ximena iman afección cardíaca. ? El bebé no está en posición con la ja para abajo para el parto. Esta posición se llama de nalgas. ? El útero tiene cicatrices de cirugías anteriores. [place] podría aumentar la probabilidad de un desgarro en el Judie Drought. ? Hay un problema con la placenta. ? La madre tiene iman infección, ximena herpes genital, que podría transmitirse al bebé. ? La madre está embarazada con mellizos o más bebés. ? El bebé pesa de 9 a 8 libras o más. · Debido a los riesgos del parto por cesárea, las cesáreas planeadas deberían hacerse generalmente solo por motivos médicos. Y iman cesárea planeada debería hacerse en la semana 44 o más tarde denis que haya un motivo médico para hacerla antes. Sepa lo que ocurrirá después del parto y cómo planificar las primeras semanas en boyd hogar · Usted, boyd bebé y boyd luis o instructor recibirán bandas de identificación. Solo las personas que tengan bandas que coincidan podrán retirar al bebé de la dre de recién nacidos. · Aprenderá a alimentar a boyd bebé, cambiar el pañal y bañar al bebé. Joeline Go a cuidar del muñón del cordón umbilical. Si [place] Elam a circuncidar a boyd bebé, también aprenderá a cuidar iman circuncisión. · Pídale a las visitas que esperen a visitarla cuando esté en boyd hogar. Y pídales que se laven las faina antes de tocar al bebé. · Asegúrese de tener otro adulto en casa por lo menos 2 o 3 días después del Marissa. · Sissy las primeras 2 semanas, limite las visitas de familiares y amigos. · No permita visitantes que tengan resfriados o infecciones. Asegúrese de que todos los visitantes estén al día con annamarie vacunas. Nunca deje que nadie fume cerca de boyd bebé. · Trate de dormir iamn siesta cuando boyd bebé duerma. Sea consciente de la depresión posparto · La \"melancolía de la maternidad\" es común en las primeras 1 o 2 semanas después del Marissa. Es posible que tenga ganas de llorar o se sienta laurel o irritable sin motivo alguno. · En algunas mujeres, estas emociones mahan más tiempo y son más intensas. A esto se lo conoce ximena depresión posparto. · Si annamarie síntomas mahan más de unas pocas semanas, o si se siente muy deprimida, pídale ayuda a boyd médico. 
· La depresión posparto sí puede tratarse. Los grupos de apoyo y la asesoría psicológica pueden ser de [place]. A veces, los medicamentos también pueden ayudar. Dónde puede encontrar más información en inglés? Jovany Mention a [email or web address]/ Escriba B044 en la búsqueda para aprender más acerca de \"Semana 45 de boyd embarazo: Instrucciones de cuidado. \" Revisado: 11 febrero, 2020               Versión del contenido: 12.6 © 4372-5840 [employer], [employer]. Las instrucciones de cuidado fueron adaptadas bajo licencia por Good Help Connections (which disclaims liability or warranty for this information). Si usted tiene [place] afección médica o sobre estas instrucciones, siempre pregunte a boyd profesional de naveen. [employer] niega toda garantía o responsabilidad por boyd uso de esta información.

## 2021-01-04 ENCOUNTER — ANESTHESIA EVENT (OUTPATIENT)
Dept: LABOR AND DELIVERY | Age: 41
End: 2021-01-04
Payer: SUBSIDIZED

## 2021-01-04 ENCOUNTER — TELEPHONE (OUTPATIENT)
Dept: OBGYN | Age: 41
End: 2021-01-04

## 2021-01-05 ENCOUNTER — HOSPITAL ENCOUNTER (INPATIENT)
Age: 41
LOS: 1 days | Discharge: HOME | End: 2021-01-06
Attending: OBSTETRICS & GYNECOLOGY | Admitting: OBSTETRICS & GYNECOLOGY
Payer: SUBSIDIZED

## 2021-01-05 ENCOUNTER — ANESTHESIA (OUTPATIENT)
Dept: LABOR AND DELIVERY | Age: 41
End: 2021-01-05
Payer: SUBSIDIZED

## 2021-01-05 DIAGNOSIS — Z98.891 S/P CESAREAN SECTION: Primary | ICD-10-CM

## 2021-01-05 PROBLEM — Z34.90 PREGNANCY: Status: ACTIVE | Noted: 2021-01-05

## 2021-01-05 LAB
ABO + RH BLD: NORMAL
BASOPHILS # BLD: 0 K/UL (ref 0–0.1)
BASOPHILS NFR BLD: 0 % (ref 0–1)
BLOOD GROUP ANTIBODIES SERPL: NORMAL
COVID-19 RAPID TEST, COVR: NOT DETECTED
DIFFERENTIAL METHOD BLD: ABNORMAL
EOSINOPHIL # BLD: 0.1 K/UL (ref 0–0.4)
EOSINOPHIL NFR BLD: 1 % (ref 0–7)
ERYTHROCYTE [DISTWIDTH] IN BLOOD BY AUTOMATED COUNT: 13.4 % (ref 11.5–14.5)
HCT VFR BLD AUTO: 41.4 % (ref 35–47)
HEALTH STATUS, XMCV2T: NORMAL
HGB BLD-MCNC: 14.2 G/DL (ref 11.5–16)
IMM GRANULOCYTES # BLD AUTO: 0.1 K/UL (ref 0–0.04)
IMM GRANULOCYTES NFR BLD AUTO: 1 % (ref 0–0.5)
LYMPHOCYTES # BLD: 2.3 K/UL (ref 0.8–3.5)
LYMPHOCYTES NFR BLD: 25 % (ref 12–49)
MCH RBC QN AUTO: 30.5 PG (ref 26–34)
MCHC RBC AUTO-ENTMCNC: 34.3 G/DL (ref 30–36.5)
MCV RBC AUTO: 89 FL (ref 80–99)
MONOCYTES # BLD: 0.8 K/UL (ref 0–1)
MONOCYTES NFR BLD: 9 % (ref 5–13)
NEUTS SEG # BLD: 5.9 K/UL (ref 1.8–8)
NEUTS SEG NFR BLD: 64 % (ref 32–75)
NRBC # BLD: 0 K/UL (ref 0–0.01)
NRBC BLD-RTO: 0 PER 100 WBC
PLATELET # BLD AUTO: 262 K/UL (ref 150–400)
PMV BLD AUTO: 10 FL (ref 8.9–12.9)
RBC # BLD AUTO: 4.65 M/UL (ref 3.8–5.2)
SOURCE, COVRS: NORMAL
SPECIMEN EXP DATE BLD: NORMAL
SPECIMEN SOURCE, FCOV2M: NORMAL
SPECIMEN TYPE, XMCV1T: NORMAL
WBC # BLD AUTO: 9.3 K/UL (ref 3.6–11)

## 2021-01-05 PROCEDURE — 85025 COMPLETE CBC W/AUTO DIFF WBC: CPT

## 2021-01-05 PROCEDURE — 74011250636 HC RX REV CODE- 250/636: Performed by: STUDENT IN AN ORGANIZED HEALTH CARE EDUCATION/TRAINING PROGRAM

## 2021-01-05 PROCEDURE — 65270000029 HC RM PRIVATE

## 2021-01-05 PROCEDURE — 3E0P05Z INTRODUCTION OF ADHESION BARRIER INTO FEMALE REPRODUCTIVE, OPEN APPROACH: ICD-10-PCS | Performed by: OBSTETRICS & GYNECOLOGY

## 2021-01-05 PROCEDURE — 74011250636 HC RX REV CODE- 250/636: Performed by: OBSTETRICS & GYNECOLOGY

## 2021-01-05 PROCEDURE — 75410000003 HC RECOV DEL/VAG/CSECN EA 0.5 HR: Performed by: OBSTETRICS & GYNECOLOGY

## 2021-01-05 PROCEDURE — 59515 CESAREAN DELIVERY: CPT | Performed by: OBSTETRICS & GYNECOLOGY

## 2021-01-05 PROCEDURE — 0DNW0ZZ RELEASE PERITONEUM, OPEN APPROACH: ICD-10-PCS | Performed by: OBSTETRICS & GYNECOLOGY

## 2021-01-05 PROCEDURE — 87635 SARS-COV-2 COVID-19 AMP PRB: CPT

## 2021-01-05 PROCEDURE — 76010000391 HC C SECN FIRST 1 HR: Performed by: OBSTETRICS & GYNECOLOGY

## 2021-01-05 PROCEDURE — 74011000250 HC RX REV CODE- 250: Performed by: OBSTETRICS & GYNECOLOGY

## 2021-01-05 PROCEDURE — 36415 COLL VENOUS BLD VENIPUNCTURE: CPT

## 2021-01-05 PROCEDURE — 86901 BLOOD TYPING SEROLOGIC RH(D): CPT

## 2021-01-05 PROCEDURE — 74011000250 HC RX REV CODE- 250: Performed by: ANESTHESIOLOGY

## 2021-01-05 PROCEDURE — 74011250636 HC RX REV CODE- 250/636: Performed by: ANESTHESIOLOGY

## 2021-01-05 PROCEDURE — 77030007866 HC KT SPN ANES BBMI -B: Performed by: ANESTHESIOLOGY

## 2021-01-05 PROCEDURE — 76060000078 HC EPIDURAL ANESTHESIA: Performed by: OBSTETRICS & GYNECOLOGY

## 2021-01-05 RX ORDER — FAMOTIDINE 10 MG/ML
INJECTION INTRAVENOUS AS NEEDED
Status: DISCONTINUED | OUTPATIENT
Start: 2021-01-05 | End: 2021-01-05 | Stop reason: HOSPADM

## 2021-01-05 RX ORDER — OXYTOCIN 10 [USP'U]/ML
INJECTION, SOLUTION INTRAMUSCULAR; INTRAVENOUS AS NEEDED
Status: DISCONTINUED | OUTPATIENT
Start: 2021-01-05 | End: 2021-01-05 | Stop reason: HOSPADM

## 2021-01-05 RX ORDER — OXYCODONE HYDROCHLORIDE 5 MG/1
10 TABLET ORAL
Status: DISPENSED | OUTPATIENT
Start: 2021-01-05 | End: 2021-01-06

## 2021-01-05 RX ORDER — HYDROCODONE BITARTRATE AND ACETAMINOPHEN 5; 325 MG/1; MG/1
1 TABLET ORAL
Status: DISCONTINUED | OUTPATIENT
Start: 2021-01-05 | End: 2021-01-06 | Stop reason: HOSPADM

## 2021-01-05 RX ORDER — FENTANYL CITRATE 50 UG/ML
INJECTION, SOLUTION INTRAMUSCULAR; INTRAVENOUS AS NEEDED
Status: DISCONTINUED | OUTPATIENT
Start: 2021-01-05 | End: 2021-01-05 | Stop reason: HOSPADM

## 2021-01-05 RX ORDER — SIMETHICONE 80 MG
80 TABLET,CHEWABLE ORAL
Status: DISCONTINUED | OUTPATIENT
Start: 2021-01-05 | End: 2021-01-06 | Stop reason: HOSPADM

## 2021-01-05 RX ORDER — DIPHENHYDRAMINE HYDROCHLORIDE 50 MG/ML
12.5 INJECTION, SOLUTION INTRAMUSCULAR; INTRAVENOUS
Status: ACTIVE | OUTPATIENT
Start: 2021-01-05 | End: 2021-01-06

## 2021-01-05 RX ORDER — EPHEDRINE SULFATE/0.9% NACL/PF 50 MG/5 ML
SYRINGE (ML) INTRAVENOUS AS NEEDED
Status: DISCONTINUED | OUTPATIENT
Start: 2021-01-05 | End: 2021-01-05 | Stop reason: HOSPADM

## 2021-01-05 RX ORDER — NALOXONE HYDROCHLORIDE 0.4 MG/ML
0.4 INJECTION, SOLUTION INTRAMUSCULAR; INTRAVENOUS; SUBCUTANEOUS AS NEEDED
Status: DISCONTINUED | OUTPATIENT
Start: 2021-01-05 | End: 2021-01-06 | Stop reason: HOSPADM

## 2021-01-05 RX ORDER — BUPIVACAINE HYDROCHLORIDE 7.5 MG/ML
INJECTION, SOLUTION EPIDURAL; RETROBULBAR AS NEEDED
Status: DISCONTINUED | OUTPATIENT
Start: 2021-01-05 | End: 2021-01-05 | Stop reason: HOSPADM

## 2021-01-05 RX ORDER — SODIUM CHLORIDE, SODIUM LACTATE, POTASSIUM CHLORIDE, CALCIUM CHLORIDE 600; 310; 30; 20 MG/100ML; MG/100ML; MG/100ML; MG/100ML
125 INJECTION, SOLUTION INTRAVENOUS CONTINUOUS
Status: DISCONTINUED | OUTPATIENT
Start: 2021-01-05 | End: 2021-01-06 | Stop reason: HOSPADM

## 2021-01-05 RX ORDER — SODIUM CHLORIDE 0.9 % (FLUSH) 0.9 %
5-40 SYRINGE (ML) INJECTION AS NEEDED
Status: DISCONTINUED | OUTPATIENT
Start: 2021-01-05 | End: 2021-01-06 | Stop reason: HOSPADM

## 2021-01-05 RX ORDER — MORPHINE SULFATE 0.5 MG/ML
INJECTION, SOLUTION EPIDURAL; INTRATHECAL; INTRAVENOUS AS NEEDED
Status: DISCONTINUED | OUTPATIENT
Start: 2021-01-05 | End: 2021-01-05 | Stop reason: HOSPADM

## 2021-01-05 RX ORDER — SODIUM CHLORIDE, SODIUM LACTATE, POTASSIUM CHLORIDE, CALCIUM CHLORIDE 600; 310; 30; 20 MG/100ML; MG/100ML; MG/100ML; MG/100ML
INJECTION, SOLUTION INTRAVENOUS
Status: DISCONTINUED | OUTPATIENT
Start: 2021-01-05 | End: 2021-01-05 | Stop reason: HOSPADM

## 2021-01-05 RX ORDER — OXYTOCIN/RINGER'S LACTATE 30/500 ML
10 PLASTIC BAG, INJECTION (ML) INTRAVENOUS AS NEEDED
Status: DISCONTINUED | OUTPATIENT
Start: 2021-01-05 | End: 2021-01-06 | Stop reason: HOSPADM

## 2021-01-05 RX ORDER — SODIUM CHLORIDE, SODIUM LACTATE, POTASSIUM CHLORIDE, CALCIUM CHLORIDE 600; 310; 30; 20 MG/100ML; MG/100ML; MG/100ML; MG/100ML
1000 INJECTION, SOLUTION INTRAVENOUS CONTINUOUS
Status: DISCONTINUED | OUTPATIENT
Start: 2021-01-05 | End: 2021-01-05 | Stop reason: HOSPADM

## 2021-01-05 RX ORDER — IBUPROFEN 800 MG/1
800 TABLET ORAL EVERY 8 HOURS
Status: DISCONTINUED | OUTPATIENT
Start: 2021-01-05 | End: 2021-01-06 | Stop reason: HOSPADM

## 2021-01-05 RX ORDER — DOCUSATE SODIUM 100 MG/1
100 CAPSULE, LIQUID FILLED ORAL 2 TIMES DAILY
Status: DISCONTINUED | OUTPATIENT
Start: 2021-01-05 | End: 2021-01-06 | Stop reason: HOSPADM

## 2021-01-05 RX ORDER — KETOROLAC TROMETHAMINE 30 MG/ML
30 INJECTION, SOLUTION INTRAMUSCULAR; INTRAVENOUS
Status: DISPENSED | OUTPATIENT
Start: 2021-01-05 | End: 2021-01-06

## 2021-01-05 RX ORDER — SODIUM CHLORIDE 0.9 % (FLUSH) 0.9 %
5-40 SYRINGE (ML) INJECTION EVERY 8 HOURS
Status: DISCONTINUED | OUTPATIENT
Start: 2021-01-05 | End: 2021-01-06 | Stop reason: HOSPADM

## 2021-01-05 RX ORDER — OXYCODONE HYDROCHLORIDE 5 MG/1
5 TABLET ORAL
Status: ACTIVE | OUTPATIENT
Start: 2021-01-05 | End: 2021-01-06

## 2021-01-05 RX ORDER — OXYTOCIN/RINGER'S LACTATE 30/500 ML
87.3 PLASTIC BAG, INJECTION (ML) INTRAVENOUS AS NEEDED
Status: DISCONTINUED | OUTPATIENT
Start: 2021-01-05 | End: 2021-01-06 | Stop reason: HOSPADM

## 2021-01-05 RX ORDER — SODIUM CHLORIDE 0.9 % (FLUSH) 0.9 %
5-40 SYRINGE (ML) INJECTION EVERY 8 HOURS
Status: DISCONTINUED | OUTPATIENT
Start: 2021-01-05 | End: 2021-01-05 | Stop reason: HOSPADM

## 2021-01-05 RX ORDER — ONDANSETRON 2 MG/ML
INJECTION INTRAMUSCULAR; INTRAVENOUS AS NEEDED
Status: DISCONTINUED | OUTPATIENT
Start: 2021-01-05 | End: 2021-01-05 | Stop reason: HOSPADM

## 2021-01-05 RX ORDER — SODIUM CHLORIDE 0.9 % (FLUSH) 0.9 %
5-40 SYRINGE (ML) INJECTION AS NEEDED
Status: DISCONTINUED | OUTPATIENT
Start: 2021-01-05 | End: 2021-01-05 | Stop reason: HOSPADM

## 2021-01-05 RX ADMIN — KETOROLAC TROMETHAMINE 30 MG: 30 INJECTION, SOLUTION INTRAMUSCULAR at 11:19

## 2021-01-05 RX ADMIN — Medication 10 MG: at 08:15

## 2021-01-05 RX ADMIN — SODIUM CHLORIDE, SODIUM LACTATE, POTASSIUM CHLORIDE, AND CALCIUM CHLORIDE 1000 ML: 600; 310; 30; 20 INJECTION, SOLUTION INTRAVENOUS at 06:02

## 2021-01-05 RX ADMIN — SODIUM CHLORIDE, POTASSIUM CHLORIDE, SODIUM LACTATE AND CALCIUM CHLORIDE: 600; 310; 30; 20 INJECTION, SOLUTION INTRAVENOUS at 08:59

## 2021-01-05 RX ADMIN — OXYTOCIN 40 UNITS: 10 INJECTION, SOLUTION INTRAMUSCULAR; INTRAVENOUS at 08:37

## 2021-01-05 RX ADMIN — MORPHINE SULFATE 100 MCG: 0.5 INJECTION, SOLUTION EPIDURAL; INTRATHECAL; INTRAVENOUS at 08:12

## 2021-01-05 RX ADMIN — Medication 10 ML: at 06:00

## 2021-01-05 RX ADMIN — CEFAZOLIN SODIUM 2 G: 1 POWDER, FOR SOLUTION INTRAMUSCULAR; INTRAVENOUS at 08:08

## 2021-01-05 RX ADMIN — FENTANYL CITRATE 10 MCG: 0.05 INJECTION, SOLUTION INTRAMUSCULAR; INTRAVENOUS at 08:12

## 2021-01-05 RX ADMIN — FAMOTIDINE 20 MG: 10 INJECTION INTRAVENOUS at 08:11

## 2021-01-05 RX ADMIN — SODIUM CHLORIDE, SODIUM LACTATE, POTASSIUM CHLORIDE, AND CALCIUM CHLORIDE 1000 ML: 600; 310; 30; 20 INJECTION, SOLUTION INTRAVENOUS at 06:45

## 2021-01-05 RX ADMIN — ONDANSETRON HYDROCHLORIDE 4 MG: 2 SOLUTION INTRAMUSCULAR; INTRAVENOUS at 08:09

## 2021-01-05 RX ADMIN — SODIUM CHLORIDE, SODIUM LACTATE, POTASSIUM CHLORIDE, AND CALCIUM CHLORIDE 125 ML/HR: 600; 310; 30; 20 INJECTION, SOLUTION INTRAVENOUS at 19:26

## 2021-01-05 RX ADMIN — SODIUM CHLORIDE, SODIUM LACTATE, POTASSIUM CHLORIDE, AND CALCIUM CHLORIDE 125 ML/HR: 600; 310; 30; 20 INJECTION, SOLUTION INTRAVENOUS at 11:23

## 2021-01-05 RX ADMIN — SODIUM CHLORIDE, SODIUM LACTATE, POTASSIUM CHLORIDE, AND CALCIUM CHLORIDE 1000 ML: 600; 310; 30; 20 INJECTION, SOLUTION INTRAVENOUS at 07:22

## 2021-01-05 RX ADMIN — BUPIVACAINE HYDROCHLORIDE 1.5 ML: 7.5 INJECTION, SOLUTION EPIDURAL; RETROBULBAR at 08:12

## 2021-01-05 RX ADMIN — OXYTOCIN 20 UNITS: 10 INJECTION, SOLUTION INTRAMUSCULAR; INTRAVENOUS at 08:59

## 2021-01-05 RX ADMIN — KETOROLAC TROMETHAMINE 30 MG: 30 INJECTION, SOLUTION INTRAMUSCULAR at 19:26

## 2021-01-05 NOTE — PROGRESS NOTES
TRANSFER - IN REPORT:    Verbal report received from [address] [place] [address] (name) on [address]  being received from Labor and Delivery (unit) for routine progression of care      Report consisted of patients Situation, Background, Assessment and   Recommendations(SBAR). Information from the following report(s) SBAR, Kardex, Intake/Output, MAR and Recent Results was reviewed with the receiving nurse. Opportunity for questions and clarification was provided. Assessment completed upon patients arrival to unit and care assumed.

## 2021-01-05 NOTE — ANESTHESIA POSTPROCEDURE EVALUATION
Procedure(s):   SECTION.    spinal    Anesthesia Post Evaluation      Multimodal analgesia: multimodal analgesia used between 6 hours prior to anesthesia start to PACU discharge  Patient location during evaluation: PACU  Patient participation: complete - patient participated  Level of consciousness: awake and alert  Airway patency: patent  Anesthetic complications: no  Cardiovascular status: acceptable  Respiratory status: acceptable  Hydration status: acceptable        INITIAL Post-op Vital signs:   Vitals Value Taken Time   BP 86/66 21 1029   Temp 36.4 °C (97.6 °F) 21 0914   Pulse 68 21 1029   Resp 16 21 0914   SpO2 100 % 21 1039   Vitals shown include unvalidated device data.

## 2021-01-05 NOTE — PROGRESS NOTES
6:30 AM  Patient admitted for a repeat  section. Consents signed and admission done using [facility] number [id number]  7:11 AM  SBAR report given to Lucille Bush RN. Care of the patient turned over at this time.

## 2021-01-05 NOTE — H&P
History & Physical    Name: Alexandria Perez MRN: [id number]  SSN: [id number]    YOB: 1980  Age: 47 y.o. Sex: female      Subjective:     Estimated Date of Delivery: 21  OB History    Para Term  AB Living   3 2 2 0 0 2   SAB TAB Ectopic Molar Multiple Live Births   0 0 0 0 0 2      # Outcome Date GA Lbr Elijah/2nd Weight Sex Delivery Anes PTL Lv   3 Current            2 Term 10/28/04 41w0d  3.629 kg F CS-Unspec  N MILAGROS   1 Term 01 41w0d  4.536 kg F CS-Unspec  N MILAGROS      Birth Comments: CS done for transverse presentation        Ms. Leti Gerber admitted with pregnancy at 40w0d for  section due to previous  section. Prenatal course complicated by late to care (~20wks), AMA, obesity, GBS+, poor dating (by 26 wk scan). Please see prenatal records for details. No past medical history on file. Past Surgical History:   Procedure Laterality Date    HX  SECTION       Social History     Occupational History    Not on file   Tobacco Use    Smoking status: Never Smoker    Smokeless tobacco: Never Used   Substance and Sexual Activity    Alcohol use: Not Currently    Drug use: Never    Sexual activity: Not on file     No family history on file. No Known Allergies  Prior to Admission medications    Medication Sig Start Date End Date Taking? Authorizing Provider   aspirin 81 mg chewable tablet Take 81 mg by mouth daily. Yes Provider, Historical   prenatal vit-calcium-iron-fa (PRENATAL PLUS with CALCIUM) 27 mg iron- 1 mg tab Take 1 Tab by mouth daily. Yes Provider, Historical   prenatal vit-iron fumarate-fa 27 mg iron- 0.8 mg tab tablet TAKE 1 TABLET BY MOUTH EVERY DAY 20   Provider, Historical   triamcinolone acetonide (KENALOG) 0.1 % topical cream Apply  to affected area two (2) times a day.  use thin layer 20   Harmeet Arceo, DO        Review of Systems: A comprehensive review of systems was negative except for that written in the History of Present Illness. Objective:     Vitals:  Vitals:    21 0604 21 0620   BP: 101/65    Pulse: 80    Resp: 14    Temp: 98.2 °F (36.8 °C)    SpO2: 99%    Weight:  70.3 kg (155 lb)        Physical Exam:  Patient without distress. Heart: Regular rate and rhythm  Lung: clear to auscultation throughout lung fields, no wheezes, no rales, no rhonchi and normal respiratory effort  Abdomen: soft, nontender  Fundus: soft and non tender  Lower Extremities:  - Edema No  Membranes:  Intact  Fetal Heart Rate: Reactive  Baseline: 130 per minute  Variability: moderate  Accelerations: no  Decelerations: none    Prenatal Labs:      RH pos, Ab screen neg, HIV NR, Hep B neg, CBC Hgb 11.8, VZV neg, RPR NR, Rubella reactive, Hbg fract wnl, urine cx >100k colonies mixed urogenital kalee, GC/CT neg, pap wnl HPV neg. Impression/Plan:     Ms. Evelia Hernandez is a [id number] admitted with pregnancy at 40w0d for  section due to previous  section. Plan:    1. Admit for  section. Hx of 2 prior CS - scheduled for 40 wks due to poor dating. 2. Group B Strep was positive, prophylactic ancef to be given prior to surgery. 3.   Covid negative on , will repeat rapid now. 4.   Late to care. Pt not seen for initial prenatal until 20 wks. 5.   AMA. Pt has been compliant w/ ASA 81mg daily. 6.   Obesity. A1C 5.2. 1hr GTT abnormal, 3hr GTT wnl. Discussed the risks of surgery including the risks of bleeding, infection, deep vein thrombosis, and surgical injuries to internal organs including but not limited to the bowels, bladder, rectum, and female reproductive organs. The patient understands the risks; any and all questions were answered to the patient's satisfaction.     Patient discussed with Dr. Aleja Mcnulty    Signed By:  Kameron Morin MD    Family Medicine Resident

## 2021-01-05 NOTE — OP NOTES
Section Delivery Procedure Note    Name: Princess Caraballo   Medical Record Number: [id number]   YOB: 1980  Today's Date: 2021      Preoperative Diagnosis: Previous  delivery affecting pregnancy [O34.219]    Postoperative Diagnosis:  section, breech      Procedure: Low Transverse  Section    Surgeon(s): Jackie Up MD    Surgical Assistants:   Circ-1: Connie Green RN  Scrub Tech-1: Slime RODRIGUEZ  Surg Asst-1: Britni Smart    Anesthesia: Spinal    Prophylactic Antibiotics: Ancef    Findings: welsh female delivered from [facility] breech presentation, dense peritoneal adhesions noted throughout. Birth Information:   Information for the patient's :  Lamaria guadalupettdenisha Amelia, Female Chela Patch [[id number]]          Umbilical Cord: 3 vessels present    Placenta:  manual removal    Specimens: * No specimens in log *     Implants: * No implants in log *    EBL: [id number]           Complications:  none      Procedure Detail:      After proper patient identification and consent, the patient was taken to the operating room, where spinal anesthesia was administered and found to be adequate. Ragsdale catheter had been placed using sterile technique. The patient was prepped and draped in the normal sterile fashion. The abdomen was entered using the Pfannenstiel technique. The peritoneum was entered bluntely well superior to the bladder without any apparent injury. Peritoneal adhesions were lysed with sharp and blunt dissection, ensuring to remain above the bladder and away from bowel. An Jan retractor was placed. Palpation revealed no bowel below the retractor. The bladder flap was created without difficulty. A low transverse uterine incision was made with the scalpel and extended with blunt finger dissection. Amniotomy was performed and the fluid was medium amount clear.    Both feet were palpated and grasped and delivered through the inicision atraumatically. The infant was delivery to the level of the scapula. The anterior arm was swept across the chest and delivered atraumatically. The posterior arm was then swept across the chest and delivered atraumatically. The babys head was then flexed and delivered atraumatically. The nose and mouth were suctioned. The cord was clamped and cut and the baby was handed off to Nursing staff in attendance. The placenta was delivered manually without difficulty  All placental fragments/membranes were removed. .  The uterus was wiped clean with a moist lap pad and cleared of all clots and debris. The uterine incision was closed in 2 layers, first with a running locked suture of 0-Vicryl, then with an imbricated layer. Adequate hemostasis was noted. Both tubes and ovaries appeared normal. The pericolic gutters were then lavaged clean with normal saline. Good hemostasis was again reassured. Seprafilm was placed along the uterine incision to help prevent future adhesions. The Jan retractor was removed. The fascia was closed with #1 vicryl  in a running fashion. Good hemostasis was assured. The incision was lavaged clean and small bleeders were coagulated with the bovie. The skin was closed with 4-0 monocryl in subcuticular fashion. The patient tolerated the procedure well. Sponge, lap, and needle counts were correct times three and the patient and baby were taken to recovery/postpartum room in stable condition.       Suha Sarkar MD  January 5, 2021  9:41 AM

## 2021-01-05 NOTE — L&D DELIVERY NOTE
Delivery Summary    Patient: Gerhardt Matin MRN: [id number]  SSN: [id number]    YOB: 1980  Age: 47 y.o. Sex: female        Information for the patient's :  Adryan Park, Female Heather Hester [[id number]]       Labor Events:    Labor: No    Steroids: None   Cervical Ripening Date/Time:       Cervical Ripening Type:     Antibiotics During Labor: No   Rupture Identifier: Sac 1    Rupture Date/Time: 2021 8:35 AM   Rupture Type: AROM   Amniotic Fluid Volume: Moderate    Amniotic Fluid Description: Clear    Amniotic Fluid Odor: None    Induction: None       Induction Date/Time:        Indications for Induction:      Augmentation: None   Augmentation Date/Time:      Indications for Augmentation:     Labor complications: None       Additional complications:        Delivery Events:  Indications For Episiotomy:     Episiotomy: None   Perineal Laceration(s): None   Repaired:     Periurethral Laceration Location:      Repaired:     Labial Laceration Location:     Repaired:     Sulcal Laceration Location:     Repaired:     Vaginal Laceration Location:     Repaired:     Cervical Laceration Location:     Repaired:     Repair Suture: None   Number of Repair Packets:     Estimated Blood Loss (ml):  ml   Quantitaive Blood Loss (ml):   395 ml     Delivery Date: 2021    Delivery Time: 8:36 AM   Delivery Type: , Low Transverse     Details    Trial of Labor: No   Primary/Repeat: Repeat   Priority: Routine   Indications:  Prior Uterine Surgery;Breech       Sex:  Female     Gestational Age: 37w0d  Delivery Clinician:  Gus Gonsales  Living Status: Living   Delivery Location: OR or #2          APGARS  One minute Five minutes Ten minutes   Skin color: 1   1        Heart rate: 2   2        Grimace: 2   2        Muscle tone: 2   2        Breathin   2        Totals: 9   9          Presentation: Breech    Position:        Resuscitation Method:  Suctioning-bulb; Tactile Stimulation     Meconium Stained: None      Cord Information: 3 Vessels  Complications: None  Cord around:    Delayed cord clamping? Cord clamped date/time:2021  8:36 AM  Disposition of Cord Blood: Lab    Blood Gases Sent?: No    Placenta:  Date/Time: 2021  8:37 AM  Removal: Spontaneous      Appearance: Normal      Measurements:  Birth Weight:        Birth Length:        Head Circumference:        Chest Circumference:       Abdominal Girth: Other Providers:   Denny ALBA;JOE GONZÁLES;BRIJESH VIDALES;;;JORDYN FELIZ;;;;;HINA MARTINEZ;DEREK EDWARD;TOM JASSO, Obstetrician;Primary Nurse;Primary  Nurse;Nicu Nurse;Neonatologist;Anesthesiologist;Crna;Nurse Practitioner;Midwife;Nursery Nurse;Resident; Resident; Resident             Group B Strep:   Lab Results   Component Value Date/Time    GrBStrep, External positive 2020     Information for the patient's :  Gretchennce Campos, Female Vilma Ibarra [[id number]]   No results found for: ABORH, PCTABR, PCTDIG, BILI, ABORHEXT, ABORH     No results for input(s): PCO2CB, PO2CB, HCO3I, SO2I, IBD, PTEMPI, SPECTI, PHICB, ISITE, IDEV, IALLEN in the last 72 hours.

## 2021-01-05 NOTE — PROGRESS NOTES
[address] [facility] [address] [address]   Office [phone], Fax [phone]                               Post-Operative Day Number 1 Progress Note    Patient doing well post-op day 1 from  delivery without significant complaints. Pain controlled. Lochia minimal.  Tolerating lactation diet. Ambulating. Ragsdale removed. Urinating well on her own. + flatus.  + BM. Vitals:    Patient Vitals for the past 8 hrs:   BP Temp Pulse Resp   21 0621 92/62 98.1 °F (36.7 °C) 87 16   21 0159 (!) 95/52 98.6 °F (37 °C) 89 16     Temp (24hrs), Av.1 °F (36.7 °C), Min:97.6 °F (36.4 °C), Max:98.6 °F (37 °C)      Vital signs stable, afebrile.     Intake and Output:        Date 21 - 21 - 21 0659   Shift 7936-2774 7294-7776 24 Hour Total 1695-3749 9471-7373 24 Hour Total   INTAKE   I.V.(mL/kg/hr) 1950(2.3)  1950(1.2)        I.V. 950  950        Volume (lactated Ringers infusion) 1000  1000      Shift Total(mL/kg) 1950(27.7)  1950(27.7)      OUTPUT   Urine(mL/kg/hr) 625(0.7) 750(0.9) 1375(0.8)        Urine Voided  200 200        Urine Output 100  100        Urine Output (mL) ([REMOVED] Urinary Catheter 21 Ragsdale)       Blood 395  395        Quantitative Blood Loss 395  395      Shift Total(mL/kg) 1020(14.5) 750(10.7) 1770(25.2)       -750 180      Weight (kg) 70.3 70.3 70.3 70.3 70.3 70.3       Exam:   Patient without distress               CTAB, no w/r/r/c    RRR, + S1 and S2, no m/r/g    Abdomen soft, fundus firm and at the umbilicus, non tender                Incision covered, dressing c/d/i, appropriately tender               Lower extremities negative for swelling, no cords or tenderness    Lab/Data Review:  Recent Results (from the past 12 hour(s))   CBC WITH AUTOMATED DIFF    Collection Time: 21  2:23 AM   Result Value Ref Range    WBC 10.7 3.6 - 11.0 K/uL    RBC 3.70 (L) 3.80 - 5.20 M/uL    HGB 11.4 (L) 11.5 - 16.0 g/dL    HCT 33.6 (L) 35.0 - 47.0 %    MCV 90.8 80.0 - 99.0 FL    MCH 30.8 26.0 - 34.0 PG    MCHC 33.9 30.0 - 36.5 g/dL    RDW 14.0 11.5 - 14.5 %    PLATELET 212 150 - 400 K/uL    MPV 9.8 8.9 - 12.9 FL    NRBC 0.0 0  WBC    ABSOLUTE NRBC 0.00 0.00 - 0.01 K/uL    NEUTROPHILS 80 (H) 32 - 75 %    LYMPHOCYTES 12 12 - 49 %    MONOCYTES 7 5 - 13 %    EOSINOPHILS 0 0 - 7 %    BASOPHILS 0 0 - 1 %    IMMATURE GRANULOCYTES 1 (H) 0.0 - 0.5 %    ABS. NEUTROPHILS 8.5 (H) 1.8 - 8.0 K/UL    ABS. LYMPHOCYTES 1.3 0.8 - 3.5 K/UL    ABS. MONOCYTES 0.8 0.0 - 1.0 K/UL    ABS. EOSINOPHILS 0.0 0.0 - 0.4 K/UL    ABS. BASOPHILS 0.0 0.0 - 0.1 K/UL    ABS. IMM. GRANS. 0.1 (H) 0.00 - 0.04 K/UL    DF AUTOMATED           Assessment and Plan:    Pauline Cooney is a 40 y.o.  s/p rLTCS at 40 weeks 0 days for prior CS x2. Pregnancy was complicated by AMA, GBS+, late to care (20 wks), obesity. Patient appears to be having uncomplicated post- course.      1. Continue routine post-op care and maternal education.    2. Incision bandage may be removed 24 hours post-op.    Patient discussed with Dr. Vizcarra.    Holly Lewis DO  Family Medicine Resident

## 2021-01-05 NOTE — ANESTHESIA PROCEDURE NOTES
Spinal Block    Start time: 1/5/2021 8:11 AM  End time: 1/5/2021 8:12 AM  Performed by: Jessica Gonzales MD  Authorized by: Jessica Gonzales MD     Pre-procedure:   Indications: at surgeon's request and primary anesthetic  Preanesthetic Checklist: patient identified, risks and benefits discussed, anesthesia consent and timeout performed      Spinal Block:   Patient Position:  Seated  Prep Region:  Lumbar  Prep: chlorhexidine      Location:  L3-4  Technique:  Single shot        Needle:   Needle Type:  Pencan  Needle Gauge:  25 G  Attempts:  1      Events: CSF confirmed, no blood with aspiration and no paresthesia        Assessment:  Insertion:  Uncomplicated  Patient tolerance:  Patient tolerated the procedure well with no immediate complications

## 2021-01-05 NOTE — LACTATION NOTE
This note was copied from a baby's chart. Experienced breastfeeding mother. She plans to breastfeed and formula feed her baby. Instructed mother to offer baby her breast milk first so that baby gets her antibodies. Mother states she tried to breastfeed after her  but baby not interested so she gave baby formula. LC able to show mother hand expression and instructed her to express 10-20 drops for baby if baby does not breastfeed. Discussed with mother her plan for feeding. Reviewed the benefits of exclusive breast milk feeding during the hospital stay. Informed her of the risks of using formula to supplement in the first few days of life as well as the benefits of successful breast milk feeding; referred her to the Breastfeeding booklet about this information. She acknowledges understanding of information reviewed and states that it is her plan to breastfeed/formula feed her infant. Will support her choice and offer additional information as needed. Encouraged mom to attempt feeding with baby led feeding cues. Just as sucking on fingers, rooting, mouthing. Looking for 8-12 feedings in 24 hours. Don't limit baby at breast, allow baby to come of breast on it's own. Baby may want to feed  often and may increase number of feedings on second day of life. Skin to skin encouraged. If baby doesn't nurse,  Mom should  hand express  10-20 drops of colostrum and drip into baby's mouth, or give to baby by finger feeding, cup feeding, or spoon feeding at least every 2-3 hours. Mother will successfully establish breastfeeding by feeding in response to early feeding cues   or wake every 3h, will obtain deep latch, and will keep log of feedings/output. Taught to BF at hunger cues and or q 2-3 hrs and to offer 10-20 drops of hand expressed colostrum at any non-feeds.       Breast Assessment  Left Breast: Medium  Left Nipple: Cracked, Intact  Right Breast: Medium  Right Nipple: Everted, Intact  Breast- Feeding Assessment  Attends Breast-Feeding Classes: No  Breast-Feeding Experience: Yes(Breast fed 2 other babies for 1 year)  Breast Trauma/Surgery: No  Type/Quality: Attempted(Mother plans to breastfeed and formula feed her baby)  Lactation Consultant Visits  Breast-Feedings: Attempted breast-feeding(Baby did not want to breastfeed so mother gave formula)   Instructed mother to call [facility] for breastfeeding assistance. Mother given breastfeeding handouts in [address] [facility]

## 2021-01-05 NOTE — ANESTHESIA PREPROCEDURE EVALUATION
Relevant Problems   No relevant active problems       Anesthetic History   No history of anesthetic complications            Review of Systems / Medical History  Patient summary reviewed, nursing notes reviewed and pertinent labs reviewed    Pulmonary  Within defined limits                 Neuro/Psych   Within defined limits           Cardiovascular  Within defined limits                  Comments: Not on Beta Blocker   GI/Hepatic/Renal  Within defined limits              Endo/Other  Within defined limits           Other Findings              Physical Exam    Airway  Mallampati: II  TM Distance: 4 - 6 cm  Neck ROM: normal range of motion   Mouth opening: Normal     Cardiovascular  Regular rate and rhythm,  S1 and S2 normal,  no murmur, click, rub, or gallop  Rhythm: regular  Rate: normal         Dental  No notable dental hx       Pulmonary  Breath sounds clear to auscultation               Abdominal  GI exam deferred       Other Findings            Anesthetic Plan    ASA: 2  Anesthesia type: spinal      Post-op pain plan if not by surgeon: intrathecal opiates      Anesthetic plan and risks discussed with: Patient

## 2021-01-05 NOTE — PROGRESS NOTES
0700 sbar report received from katlyn Rivas rn  3229 dr Navya Olivas covid test this am negative, dr Cali Roblero in  1405 SBAR OUT Report: Mother    Verbal report given to leslie wallace rn (full name & credentials) on this patient, who is now being transferred to miu (unit) for routine progression of care. Report consisted of patient's Situation, Background, Assessment and Recommendations (SBAR).  ID bands were compared with the identification form, and verified with the patient and receiving nurse. Information from the SBAR, Intake/Output, MAR, Recent Results and Med Rec Status and the [place] Report was reviewed with the receiving nurse; opportunity for questions and clarification provided.

## 2021-01-06 VITALS
SYSTOLIC BLOOD PRESSURE: 104 MMHG | TEMPERATURE: 97.6 F | DIASTOLIC BLOOD PRESSURE: 55 MMHG | OXYGEN SATURATION: 100 % | RESPIRATION RATE: 16 BRPM | HEART RATE: 73 BPM | BODY MASS INDEX: 34.86 KG/M2 | WEIGHT: 155 LBS

## 2021-01-06 LAB
BASOPHILS # BLD: 0 K/UL (ref 0–0.1)
BASOPHILS NFR BLD: 0 % (ref 0–1)
DIFFERENTIAL METHOD BLD: ABNORMAL
EOSINOPHIL # BLD: 0 K/UL (ref 0–0.4)
EOSINOPHIL NFR BLD: 0 % (ref 0–7)
ERYTHROCYTE [DISTWIDTH] IN BLOOD BY AUTOMATED COUNT: 14 % (ref 11.5–14.5)
HCT VFR BLD AUTO: 33.6 % (ref 35–47)
HGB BLD-MCNC: 11.4 G/DL (ref 11.5–16)
IMM GRANULOCYTES # BLD AUTO: 0.1 K/UL (ref 0–0.04)
IMM GRANULOCYTES NFR BLD AUTO: 1 % (ref 0–0.5)
LYMPHOCYTES # BLD: 1.3 K/UL (ref 0.8–3.5)
LYMPHOCYTES NFR BLD: 12 % (ref 12–49)
MCH RBC QN AUTO: 30.8 PG (ref 26–34)
MCHC RBC AUTO-ENTMCNC: 33.9 G/DL (ref 30–36.5)
MCV RBC AUTO: 90.8 FL (ref 80–99)
MONOCYTES # BLD: 0.8 K/UL (ref 0–1)
MONOCYTES NFR BLD: 7 % (ref 5–13)
NEUTS SEG # BLD: 8.5 K/UL (ref 1.8–8)
NEUTS SEG NFR BLD: 80 % (ref 32–75)
NRBC # BLD: 0 K/UL (ref 0–0.01)
NRBC BLD-RTO: 0 PER 100 WBC
PLATELET # BLD AUTO: 212 K/UL (ref 150–400)
PMV BLD AUTO: 9.8 FL (ref 8.9–12.9)
RBC # BLD AUTO: 3.7 M/UL (ref 3.8–5.2)
WBC # BLD AUTO: 10.7 K/UL (ref 3.6–11)

## 2021-01-06 PROCEDURE — 74011250637 HC RX REV CODE- 250/637: Performed by: STUDENT IN AN ORGANIZED HEALTH CARE EDUCATION/TRAINING PROGRAM

## 2021-01-06 PROCEDURE — 85025 COMPLETE CBC W/AUTO DIFF WBC: CPT

## 2021-01-06 PROCEDURE — 74011250636 HC RX REV CODE- 250/636: Performed by: STUDENT IN AN ORGANIZED HEALTH CARE EDUCATION/TRAINING PROGRAM

## 2021-01-06 PROCEDURE — 36415 COLL VENOUS BLD VENIPUNCTURE: CPT

## 2021-01-06 PROCEDURE — 2709999900 HC NON-CHARGEABLE SUPPLY

## 2021-01-06 RX ORDER — IBUPROFEN 800 MG/1
800 TABLET ORAL EVERY 8 HOURS
Qty: 30 TAB | Refills: 0 | Status: SHIPPED | OUTPATIENT
Start: 2021-01-06

## 2021-01-06 RX ORDER — HYDROCODONE BITARTRATE AND ACETAMINOPHEN 5; 325 MG/1; MG/1
1 TABLET ORAL
Qty: 15 TAB | Refills: 0 | Status: SHIPPED | OUTPATIENT
Start: 2021-01-06 | End: 2021-01-10

## 2021-01-06 RX ORDER — DOCUSATE SODIUM 100 MG/1
100 CAPSULE, LIQUID FILLED ORAL DAILY
Qty: 20 CAP | Refills: 0 | Status: SHIPPED | OUTPATIENT
Start: 2021-01-06 | End: 2021-01-26

## 2021-01-06 RX ADMIN — HYDROCODONE BITARTRATE AND ACETAMINOPHEN 1 TABLET: 5; 325 TABLET ORAL at 17:44

## 2021-01-06 RX ADMIN — IBUPROFEN 800 MG: 800 TABLET, FILM COATED ORAL at 08:41

## 2021-01-06 RX ADMIN — DOCUSATE SODIUM 100 MG: 100 CAPSULE, LIQUID FILLED ORAL at 08:41

## 2021-01-06 RX ADMIN — OXYCODONE HYDROCHLORIDE 10 MG: 5 TABLET ORAL at 08:41

## 2021-01-06 RX ADMIN — IBUPROFEN 800 MG: 800 TABLET, FILM COATED ORAL at 17:43

## 2021-01-06 RX ADMIN — DOCUSATE SODIUM 100 MG: 100 CAPSULE, LIQUID FILLED ORAL at 17:44

## 2021-01-06 RX ADMIN — KETOROLAC TROMETHAMINE 30 MG: 30 INJECTION, SOLUTION INTRAMUSCULAR at 01:27

## 2021-01-06 NOTE — PROGRESS NOTES
1/6/2021  2:51 PM    CM met with IRVIN to complete initial assessment and begin discharge planning. MOB verified and confirmed demographics. IRVIN lives with her children ages 21, and 12, at the address on file. 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 IRVIN reports that FOB is not involved. IRVIN states she has good family support. IRVIN plans to breast and bottle feed baby. IRVIN plans to follow with [facility] for pediatric care and has appt set up for Friday 1/8 at 3:30pm.  IRVIN has car seat, bassinet/crib, clothing, bottles and all necessary supplies for baby. IRVIN does not have insurance and would like to apply for Medicaid for herself and baby. [employer] notified to assist with application. IRVIN noted she is also receiving WIC services and understands how to add baby to program.   IRVIN's daughter- Shannan Primmer will provide transportation at discharge. Care Management Interventions  PCP Verified by CM: Yes([facility])  Mode of Transport at Discharge:  Other (see comment)  Transition of Care Consult (CM Consult): Discharge Planning  Current Support Network: Own Home, Family Lives Nearby  Confirm Follow Up Transport: Family  Discharge Location  Discharge Placement: Home with family assistance  Rocio Jaimes

## 2021-01-06 NOTE — DISCHARGE SUMMARY
Obstetrical Discharge Summary     Name: Jose Islas MRN: [id number]  SSN: [id number]    YOB: 1980  Age: 47 y.o. Sex: female      Admit Date: 2021    Discharge Date: 2021     Admitting Physician: Hernan Mccann MD     Attending Physician:  Julia Farah DO     Admission Diagnoses: Previous  delivery affecting pregnancy [O34.219]  Pregnancy [Z34.90]    Discharge Diagnoses:   Information for the patient's :  Sudhakar Favorite, Female Layton Patel [[id number]]   Delivery of a 3.455 kg female infant via , Low Transverse on 2021 at 8:36 AM  by Hernan Mccann. Apgars were 9  and 9 . Additional Diagnoses:   Hospital Problems  Date Reviewed: 2020          Codes Class Noted POA    Pregnancy ICD-10-CM: Z34.90  ICD-9-CM: V22.2  2021 Unknown             Lab Results   Component Value Date/Time    Rubella, External immune 2020    GrBStrep, External positive 2020       Immunization(s):   Immunization History   Administered Date(s) Administered    Influenza Vaccine Qomuty) PF (>6 Mo Flulaval, Fluarix, and >3 Gracie Phlegm [id number]) 2020    Tdap 10/28/2020        Hospital Course:   Patient is a 47 y.o.  s/p repeat LTCS at 40w0d. Presented for repeat LTCS. Pregnancy complicated by AMA, GBS+, late to care (20 weeks), obesity. Labor was uncomplicated. Delivered TLFI by rLTCS without complications. Normal hospital course following the delivery. On day of discharge patient reported minimal lochia, well controlled pain, and no other complaints. Discharged with pain regimen and bowel regimen. Advised to continue prenatal vitamins. Remove steri strips in 7 days. Follow up with OB/PCP in 2 weeks for incision check and 6 weeks for routine pp visit.      Depression Scale           Follow up test at discharge: None  Condition at Discharge: Stable  Disposition: Discharge to Home    Physical exam:  Visit Vitals  BP (!) 92/58 (BP 1 Location: Right arm, BP Patient Position: At rest)   Pulse 90   Temp 98.1 °F (36.7 °C)   Resp 20   Wt 70.3 kg (155 lb)   LMP 2020 (Exact Date)   SpO2 100%   Breastfeeding Unknown   BMI 34.86 kg/m²       Exam:  Patient without distress. CTAB, no w/r/r/c               RRR, + S1 and S2, no m/r/g               Abdomen soft, fundus firm at level of umbilicus, non tender. Incision intact with steri strips in place, some dried blood. Perineum with normal lochia noted. Lower extremities are negative for swelling, cords or tenderness. Patient Instructions:   Current Discharge Medication List      START taking these medications    Details   docusate sodium (COLACE) 100 mg capsule Take 1 Cap by mouth daily for 20 days. Qty: 20 Cap, Refills: 0      HYDROcodone-acetaminophen (NORCO) 5-325 mg per tablet Take 1 Tab by mouth every six (6) hours as needed for Pain for up to 4 days. Max Daily Amount: 4 Tabs. Qty: 15 Tab, Refills: 0    Associated Diagnoses: S/P  section      ibuprofen (MOTRIN) 800 mg tablet Take 1 Tab by mouth every eight (8) hours. Qty: 30 Tab, Refills: 0         CONTINUE these medications which have NOT CHANGED    Details   prenatal vit-calcium-iron-fa (PRENATAL PLUS with CALCIUM) 27 mg iron- 1 mg tab Take 1 Tab by mouth daily. triamcinolone acetonide (KENALOG) 0.1 % topical cream Apply  to affected area two (2) times a day. use thin layer  Qty: 15 g, Refills: 0         STOP taking these medications       aspirin 81 mg chewable tablet Comments:   Reason for Stopping:         prenatal vit-iron fumarate-fa 27 mg iron- 0.8 mg tab tablet Comments:   Reason for Stopping:                 Reference my discharge instructions.     Follow-up Information     Follow up With Specialties Details Why Terry Salvador MD Family Medicine On 2021 [address] [place] a las 8:10 en la [place] - [place]im[place] inicial para recien nacido Rhonda Guidry [address] [facility] [place] [address]  [phone]      Kaiden Banks DO Family Medicine On 1/19/2021 [facility], 23 de enero a las 8:20 en la [place] para sequimento despues de la cesaria  [address] [facility] [address]  [phone]              Signed By:  Austin Villaseñor DO    Family Medicine Resident

## 2021-01-06 NOTE — DISCHARGE INSTRUCTIONS
Patient Education        Parto por cesárea: Aman Fisher en el hogar   Section: What to Expect at Home  Tariq recuperación    Un parto por cesárea, o simplemente cesárea, es iman cirugía mediante la cual se da a farhat a un bebé a través de un mikie, llamado incisión, que hace el médico en la parte baja del abdomen y [place]. Es posible que sienta dolor en la parte baja del abdomen y que necesite analgésicos (medicamentos para el dolor) kuldeep 1 o 2 semanas. Puede esperar tener algo de sangrado vaginal kuldeep varias semanas. Es probable que necesite unas 6 semanas para recuperarse completamente. Es importante que se tome las cosas con calma mientras omero la incisión. Evite levantar objetos pesados, hacer actividades vigorosas o hacer ejercicios que pudieran esforzar los músculos abdominales mientras se recupera. Pídale a un familiar o amigo que la ayude con las tareas domésticas, la comida y las compras. Esta hoja de [employer] idea general del tiempo que le llevará recuperarse. Sin embargo, cada persona se recupera a un ritmo diferente. Siga los pasos que se mencionan a continuación para recuperarse lo más rápido posible. ¿Cómo puede cuidarse en el hogar? Actividad    · Descanse cuando se sienta cansada. Dormir lo suficiente la ayudará a recuperarse.     · Intente caminar todos los días. Comience caminando un poco más de lo que caminó el día anterior. Poco a poco, aumente la distancia. Caminar mejora el flujo de malcolm y [place] a prevenir la neumonía, el estreñimiento y los coágulos de malcolm.     · Evite las actividades intensas, ximena montar en [place], trotar, levantar pesas y hacer ejercicios aeróbicos kuldeep 6 semanas o hasta que tariq médico lo apruebe.     · Hasta que tariq médico lo apruebe, no levante nada más pesado que tariq bebé.      · No albert abdominales ni ningún otro ejercicio que [employer] músculos del abdomen kuldeep 6 semanas o hasta que tariq médico lo apruebe.     · Karyn Doyle almohada sobre la incisión al toser o respirar profundamente. Yehuda Homer apoyo a boyd abdomen y reducirá el dolor.     · Puede ducharse ximena de costumbre. Seque la incisión con toques suaves de toalla cuando termine.     · Tendrá algo de sangrado vaginal. Use toallas sanitarias. No se albert lavados vaginales ni use tampones hasta que el médico se lo permita.     · Pregúntele a boyd médico cuándo puede volver a conducir.     · Es probable que necesite ausentarse del trabajo por lo menos 6 semanas. Philippi dependerá del tipo de trabajo que albert y de cómo se sienta.     · Pregúntele a boyd médico cuándo puede tener relaciones sexuales. Alimentación    · Puede continuar con boyd dieta normal. Si tiene malestar estomacal, coma alimentos suaves bajos en grasa, ximena arroz sin condimentar, sami a la leslee, pan torie y yogur.     · Helen abundantes líquidos (a menos que boyd médico le indique lo contrario).     · Podría notar que no evacua el intestino con regularidad bravo después de la cirugía. Philippi es común. Trate de evitar el estreñimiento y no hacer esfuerzos cuando evacua el intestino. Jorge vez desee pablo un suplemento de [place]. Si no ha evacuado el intestino después de un par de días, pregúntele a boyd médico si puede pablo un laxante suave.     · Si está amamantando, limite el alcohol. El alcohol puede causar falta de energía y otros problemas de naveen para el bebé cuando iman mary que [place]. Lucy Constant ser un obstáculo en la capacidad de iman mamá para alimentar a boyd bebé o para cuidarlo en otros aspectos. No hay mucha investigación sobre qué cantidad exacta de alcohol puede ser perjudicial para un bebé. No consumir alcohol es la opción más alcazar para boyd bebé. Si opta por beber Raynald Mejia bebida alcohólica de vez en cuando, solo tome iman bebida y limite las ocasiones en que helen alcohol.  Después de beber alcohol, espere al menos 2 horas antes de amamantar para reducir la cantidad de alcohol que el bebé pueda recibir a través de [address]. Medicamentos    · Boyd médico le dirá si puede volver a pablo annamarie medicamentos y cuándo puede volver a hacerlo. También le dará indicaciones sobre cualquier medicamento nuevo que deba pablo usted.     · Si candido aspirina o cualquier otro medicamento que previene los coágulos de [place], pregúntele a boyd médico si debería volver a tomarlo y en qué momento. Asegúrese de entender exactamente lo que boyd médico quiere que albert.     · Arcade los analgésicos (medicamentos para el dolor) exactamente ximena le fueron indicados. ? Si el médico le recetó un analgésico, tómelo según las indicaciones. ? Si no está tomando un analgésico recetado, pregúntele a boyd médico si puede pablo wilrfid de [employer].     · Si le parece que el analgésico le está produciendo malestar estomacal:  ? Arcade el medicamento después de las comidas (a menos que boyd médico le haya indicado lo contrario). ? Pídale al médico un analgésico diferente.     · Si boyd médico le recetó antibióticos, tómelos según las indicaciones. No deje de tomarlos por el hecho de sentirse mejor. Debe pablo todos los antibióticos hasta terminarlos. Cuidado de la incisión    · Si tiene tiras de cinta [employer] incisión, déjeselas puestas kuldeep iman semana o hasta que se caigan por sí solas.     · Lave la endy a diario con agua jabonosa tibia y séquela con toques suaves de toalla. No use peróxido de hidrógeno [place]) o alcohol porque pueden retrasar la sanación. Podría cubrir la endy con iman venda de gasa si supura o roza contra la ropa. Cambie la venda todos los elida.     · Mantenga la endy limpia y [place] Pafos. Otras instrucciones    · Si amamanta a boyd bebé, shekhar vez le resulte más cómodo, kuldeep el proceso de sanación, sostener al bebé de [place] and [place] en la que no se apoye en boyd abdomen. Intente poner a boyd bebé debajo del brazo, con el cuerpo del lado que lo Martin Burrs a amamantar.  Sostenga la parte superior del cuerpo de boyd bebé con boyd brazo. Con smitha mano usted puede controlar la ja de boyd bebé para acercarle la boca a boyd seno. [place] se conoce a veces xmiena \"posición de balón de fútbol americano\".   La atención de seguimiento es iman parte clave de boyd tratamiento y seguridad. Asegúrese de hacer y acudir a todas las citas, y llame a boyd médico si está teniendo problemas. También es iman buena idea saber los resultados de annamarie exámenes y mantener iman lista de los medicamentos que candido.  ¿Cuándo debe pedir ayuda?  Llame al 911 en cualquier momento que considere que necesita atención de urgencia. Por ejemplo, llame si:    · Tiene pensamientos de herirse a sí misma, hacerle daño a boyd bebé o a otra persona.     · Se desmayó (perdió el conocimiento).     · Tiene dolor en el pecho, le falta el aire o tose malcolm.     · Tiene convulsiones.   Llame a boyd médico ahora mismo o busque atención médica inmediata si:    · Tiene dolor que no mejora después de pablo un analgésico (medicamento para el dolor).     · Tiene sangrado vaginal intenso.     · Está mareada o aturdida, o siente ximena si estuviera por desmayarse.     · Tiene un dolor nuevo o peor en el abdomen o la pelvis.     · Tiene puntos de sutura flojos o se le abre la incisión.     · Tiene síntomas de infección, ximena:  ? Aumento del dolor, la hinchazón, la temperatura o el enrojecimiento.  ? Vetas rojizas que salen de la incisión.  ? Pus que sale de la incisión.  ? Fiebre.     · Tiene síntomas de un coágulo de malcolm en la pierna (que se llama trombosis venosa profunda), ximena:  ? Dolor en la pantorrilla, el muslo, la derrek o detrás de la rodilla.  ? Enrojecimiento e hinchazón en la pierna o la derrek.     · Tiene señales de preeclampsia, ximena:  ? Hinchazón repentina de la eva, las faina o los pies.  ? Nuevos problemas de visión (ximena oscurecimiento, israel borroso o israel puntos).  ? Dolor de ja intenso.   Preste especial atención a los cambios en boyd naveen y asegúrese de comunicarse con boyd médico  si:    · No mejora ximena se esperaba. ¿Dónde puede encontrar más información en inglés? Vaya a [email or web address]  Seng Hurtado M806 en la búsqueda para aprender más acerca de \"Parto por cesárea: Nicolasa Hsu en el hogar. \"  Revisado: 2020               Versión del contenido: 12.6  © 5042-4283 Healthwise, Incorporated. Las instrucciones de cuidado fueron adaptadas bajo licencia por [employer] (which disclaims liability or warranty for this information). Si usted tiene [place] afección médica o sobre estas instrucciones, siempre pregunte a boyd profesional de naveen. Healthwise, Incorporated niega toda garantía o responsabilidad por boyd uso de esta información. Patient Discharge Instructions    Janes Ann / [id number] : 1980    Admitted 2021 Discharged: 2021       Por favor tenga pablo documento presente en boyd kimberly de seguimiento con boyd médico primario.     Primary care provider:  Elizabeth Glynn DO    Discharging provider:  Craig Castle DO  - Family Medicine Resident  Clemente Austin MD -  OBGYN attending          [address]  Previous  delivery affecting pregnancy [O34.219]  Pregnancy [Z34.90]      RECOMENDACIONES DE CUIDADO:     Follow-up Information     Follow up With Specialties Details Why Contact Info    Ubaldo Owens MD Family Medicine On 2021 [address]  a las 8:10 en [address] [place] [address] inicial para recien nacido [address] [facility] [address]  [phone]      Leeann Nogueira DO Family Medicine On 2021 [place]  a las 8:20 en la [place] para sequimento despues de la cesaria  [address]  [phone]            Continue Tratamiento:  - Por favor continue Motrin 800 mg, 1 tableta cada 8 horas por los próximos 2 días, luego 1 tableta cada 8 horas mientras sea necesario para el dolor.  - Por favor continue Percocet/Norco 5-325 mg, tome 1 tableta cada 4 horas mientras sea necesario para el dolor.  - Por favor continue Colace 100 mg para el estreñimiento, tome 1 tableta dos veces al día hasta que pueda defercar regularmente sin necesidad del medicamento. - Por favor continue tomando annamarie vitaminas prenatales. Pruebas de seguimiento que necesita: None    Resultados pendientes:   En el momento de boyd de cem los resultados de las siguientes pruebas están pendiente: None. Por favor discuta estos resultados con boyd proveedor primario en boyd kimberly de seguimiento. Importante que [employer] siguientes síntomas: dolor de [place], falta de [place], [place], escalofríos, náusea, vómito, diarrea, cambios en boyd estado mental, caídas, debilidad y sangrado. DIETA/que comer:  Regular    ACTIVIDAD FÍSICA:   Instrucciones de [facility]    No levante nada que sea más pesado que boyd bebé por las próximas 6 semanas. No insertar nada por la vaginal por 6 semanas. Luego del parto por cesárea/ vaginal debe evitar tener relaciones sexuales por 6 semanas. Tendrá boyd kimberly de seguimiento posparto a las 6 semanas. Puede manejar boyd vehículo mientras no esté tomando Percocet ni ningún medicamento nárcotico (Motrin está reyes). Cuidado de Herida:   Puede remover el vendaje de boyd herida en 1 semana. Puede bañarse ximena de costumbre. NO se restriegue la herida. Deje que el agua tibia con jabón corra por encima de la herida. No se sumerga en la bañera con [place], no vaya a nadar hasta que boyd médico diga que es apropiado. Entiendo que si surge algún problema cuando llegue a mi hogar puedo contactar a mi médico.    Me corona explicado las siguientes instrucciones y corona aclarado mis dudas. Entiendo y reconozco las instrucciones brindadas.                                                                                                                                                Firma de Renaee Cushing / Alma Delia Steve Fecha/Hora                                                                                                                                              Firma de paciente ó representante                                                         Fecha/Hora

## 2021-01-06 NOTE — LACTATION NOTE
This note was copied from a baby's chart. Mother and baby for early discharge. Mother has been formula feeding her baby. She wants to wait until her breast milk comes in before she puts baby to breast. LC instructed her to call if she changes her mind. Instructed her to offer baby her breast milk first before formula. Mother is not interested in getting a breast pump. Reviewed breastfeeding basics:  Supply and demand,  stomach size, early  Feeding cues, skin to skin, positioning and baby led latch-on, assymetrical latch with signs of good, deep latch vs shallow, feeding frequency and duration, and log sheet for tracking infant feedings and output. Breastfeeding Booklet and Warm line information given. Discussed typical  weight loss and the importance of infant weight checks with pediatrician 1-2 post discharge. Engorgement Care Guidelines:  Reviewed how milk is made and normal phases of milk production. Taught care of engorged breasts - frequent breastfeeding encouraged, cool packs and motrin as tolerated. Anticipatory guidance shared. Discussed eating a healthy diet. Instructed mother to eat a variety of foods in order to get a well balanced diet. She should consume an extra 500 calories per day (more than her non-pregnant requirement.) These extra calories will help provide energy needed for optimal breast milk production. Mother also encouraged to \"drink to thirst\" and it is recommended that she drink fluids such as water, fruit/vegetable juice. Nutritious snacks should be available so that she can eat throughout the day to help satisfy her hunger and maintain a good milk supply. Discussed what to do if nipples become sore. Care for sore/tender nipples discussed:  ways to improve positioning and latch practiced and discussed, hand express colostrum after feedings and let air dry, light application of lanolin (which was given to mother).     Pt will successfully establish breastfeeding by feeding in response to early feeding cues   or wake every 3h, will obtain deep latch, and will keep log of feedings/output. Taught to BF at hunger cues and or q 2-3 hrs and to offer 10-20 drops of hand expressed colostrum at any non-feeds. Breast Assessment  Left Breast: Medium  Left Nipple: Everted, Intact  Right Breast: Medium  Right Nipple: Everted, Intact  Breast- Feeding Assessment  Attends Breast-Feeding Classes: No  Breast-Feeding Experience: Yes  Breast Trauma/Surgery: No  Type/Quality: Attempted(Mother plans to breastfeed and formula feed her baby)  Lactation Consultant Visits  Breast-Feedings: Not breast-feeding(Mother has been formula feeding. She wants to try and breastfeed baby after her breast milk comes in)     Mother has breastfeeding handouts given in [address] and LC#.

## 2021-01-07 NOTE — PROGRESS NOTES
Patient discharged to home with infant and family. Infant in carseat. Patient in wheelchair. Prescriptions e-sent to patient's pharmacy by md. Discharge instructions reviewed with patient and family via [address] [facility]  [id number], signed by patient, and copies given. Per patient and family, no questions. Patient and infant bands verified. See infant note and/or footprint sheet on chart.

## 2022-03-19 PROBLEM — Z34.90 PREGNANCY: Status: ACTIVE | Noted: 2021-01-05

## 2023-05-15 RX ORDER — IBUPROFEN 800 MG/1
800 TABLET ORAL EVERY 8 HOURS
COMMUNITY
Start: 2021-01-06

## 2023-05-15 RX ORDER — TRIAMCINOLONE ACETONIDE 1 MG/G
CREAM TOPICAL 2 TIMES DAILY
COMMUNITY
Start: 2020-09-04

## (undated) DEVICE — STERILE POLYISOPRENE POWDER-FREE SURGICAL GLOVES: Brand: PROTEXIS

## (undated) DEVICE — HANDLE LT SNAP ON ULT DURABLE LENS FOR TRUMPF ALC DISPOSABLE

## (undated) DEVICE — SUTURE CHROMIC GUT SZ 2-0 L36IN ABSRB BRN L36MM CT-1 1/2 923H

## (undated) DEVICE — PAD,ABDOMINAL,5"X9",ST,LF,25/BX: Brand: MEDLINE INDUSTRIES, INC.

## (undated) DEVICE — POOLE SUCTION INSTRUMENT WITH REMOVABLE SHEATH: Brand: POOLE

## (undated) DEVICE — SOLIDIFIER FLUID 3000 CC ABSORB

## (undated) DEVICE — (D)PREP SKN CHLRAPRP APPL 26ML -- CONVERT TO ITEM 371833

## (undated) DEVICE — BLADE ASSEMB CLP HAIR FINE --

## (undated) DEVICE — SUTURE VCRL SZ 4-0 L27IN ABSRB UD L24MM FS-1 3/8 CIR REV J441H

## (undated) DEVICE — SOLUTION IV 1000ML 0.9% SOD CHL

## (undated) DEVICE — STRIP,CLOSURE,WOUND,MEDI-STRIP,1/2X4: Brand: MEDLINE

## (undated) DEVICE — GARMENT,MEDLINE,DVT,INT,CALF,MED, GEN2: Brand: MEDLINE

## (undated) DEVICE — SUTURE ABSRB BRAID COAT UD CT NO 1 36IN VCRL J959H

## (undated) DEVICE — ROCKER SWITCH PENCIL HOLSTER: Brand: VALLEYLAB

## (undated) DEVICE — TIP CLEANER: Brand: VALLEYLAB

## (undated) DEVICE — C-SECTION II-LF: Brand: MEDLINE INDUSTRIES, INC.

## (undated) DEVICE — STERILE POLYISOPRENE POWDER-FREE SURGICAL GLOVES WITH EMOLLIENT COATING: Brand: PROTEXIS

## (undated) DEVICE — GOWN,AURORA,FABRIC-REINFORCED,X-LARGE: Brand: MEDLINE

## (undated) DEVICE — SYRINGE IRRIG 60ML SFT PLIABLE BLB EZ TO GRP 1 HND USE W/

## (undated) DEVICE — 3000CC GUARDIAN II: Brand: GUARDIAN

## (undated) DEVICE — SUTURE VCRL SZ 0 L36IN ABSRB VLT L40MM CT 1/2 CIR J358H

## (undated) DEVICE — TOWEL,OR,DSP,ST,BLUE,STD,2/PK,40PK/CS: Brand: MEDLINE

## (undated) DEVICE — TRAY,URINE METER,100% SILICONE,16FR10ML: Brand: MEDLINE

## (undated) DEVICE — SOLUTION IRRIG 1000ML H2O STRL BLT

## (undated) DEVICE — STRAP,POSITIONING,KNEE/BODY,FOAM,4X60": Brand: MEDLINE

## (undated) DEVICE — SUTURE PLN GUT SZ 2-0 L27IN ABSRB YELLOWISH TAN L40MM CT 853H

## (undated) DEVICE — BINDER ABD M/L H12IN FOR 46-62IN WHT 4 SLD PNL DSGN HOOP

## (undated) DEVICE — LARGE, DISPOSABLE ALEXIS O C-SECTION PROTECTOR - RETRACTOR: Brand: ALEXIS ® O C-SECTION PROTECTOR - RETRACTOR

## (undated) DEVICE — REM POLYHESIVE ADULT PATIENT RETURN ELECTRODE: Brand: VALLEYLAB

## (undated) DEVICE — MASTISOL ADHESIVE LIQ 2/3ML

## (undated) DEVICE — SUTURE VCRL SZ 2-0 L36IN ABSRB UD L36MM CT-1 1/2 CIR J945H